# Patient Record
(demographics unavailable — no encounter records)

---

## 2024-10-28 NOTE — PHYSICAL EXAM
[Normal Coordination] : normal coordination [Normal DTR UE/LE] : normal DTR UE/LE  [Normal Sensation] : normal sensation [Normal Mood and Affect] : normal mood and affect [Oriented] : oriented [Able to Communicate] : able to communicate [Normal Skin] : normal skin [No Rash] : no rash [No Ulcers] : no ulcers [No Lesions] : no lesions [No obvious lymphadenopathy in areas examined] : no obvious lymphadenopathy in areas examined [Well Developed] : well developed [Peripheral vascular exam is grossly normal] : peripheral vascular exam is grossly normal [No Respiratory Distress] : no respiratory distress [Flexion] : flexion [Extension] : extension [4___] : left quadriceps 4[unfilled]/5 [3___] : left dorsiflexors 3[unfilled]/5 [5___] : right extensor hallicus longus 5[unfilled]/5 [de-identified] : Constitutional: - General Appearance: Unremarkable Body Habitus Well Developed Well Nourished Body Habitus No Deformities Well Groomed Ability To communicate: Normal Neurologic: Global sensation is intact to upper and lower extremities. See examination of Neck and/or Spine for exceptions. Orientation to Time, Place and Person is: Normal Mood And Affect is Normal Skin: - Head/Face, Right Upper/Lower Extremity, Left Upper/Lower Extremity: Normal See Examination of Neck and/or Spine for exceptions Cardiovascular: Peripheral Cardiovascular System is Normal Palpation of Lymph Nodes: Normal Palpation of lymph nodes in: Axilla, Cervical, Inguinal Abnormal Palpation of lymph nodes in: None  [] : motor exam is not 5/5 throughout both lower extremities with normal tone

## 2024-10-28 NOTE — DISCUSSION/SUMMARY
[de-identified] : L spine MRI 11/7/23 - laminectomy L5S1, grade 1 spondylolisthesis and b/ foraminal stenosis  Plan:  Patient will continue with interventional pain management procedures - she is awaiting authorization for indicated second injection by Dr. Alvarez which I would conquer would be beneficial in improving back/leg pan, as wlel as overall functional capacity. Discussed implementing voltaren gel and thermacare bandages to the affected area. She is also in the process of seeking chiropractic care. Surgical solutions were briefly discussed, but at this time, patient wishes to try and seek additional pain control with the above modalities. Recommend evaluation in six weeks. Risks and potential side effects of narcotics and NSAID medication's were discussed with the patient. Renewed ibuprofen and oxycodone.  - FUV 5/6 WKS.   Prior to appointment and during encounter with patient extensive medical records were reviewed including but not limited to, hospital records, outpatient records, imaging results, and lab data.During this appointment the patient was examined, diagnoses were discussed and explained in a face to face manner. In addition extensive time was spent reviewing aforementioned diagnostic studies. Counseling including abnormal image results, differential diagnoses, treatment options, risk and benefits, lifestyle changes, current condition, and current medications was performed. Patient's comments, questions, and concerns were addressed and patient verbalized understanding. Based on this patient's presentation at our office, which is an orthopedic spine surgeon's office, this patient inherently / intrinsically has a risk, however minute, of developing issues such as Cauda equina syndrome, bowel and bladder changes, or progression of motor or neurological deficits such as paralysis which may be permanent.  MEGHANA SERRANO Acting as a Scribe for Dr. Dontae ALVAREZ, Meghana Serrano, attest that this documentation has been prepared under the direction and in the presence of Provider Moisés Diggs MD.

## 2024-10-28 NOTE — DISCUSSION/SUMMARY
[de-identified] : L spine MRI 11/7/23 - laminectomy L5S1, grade 1 spondylolisthesis and b/ foraminal stenosis  Plan:  Patient will continue with interventional pain management procedures - she is awaiting authorization for indicated second injection by Dr. Alvarez which I would conquer would be beneficial in improving back/leg pan, as wlel as overall functional capacity. Discussed implementing voltaren gel and thermacare bandages to the affected area. She is also in the process of seeking chiropractic care. Surgical solutions were briefly discussed, but at this time, patient wishes to try and seek additional pain control with the above modalities. Recommend evaluation in six weeks. Risks and potential side effects of narcotics and NSAID medication's were discussed with the patient. Renewed ibuprofen and oxycodone.  - FUV 5/6 WKS.   Prior to appointment and during encounter with patient extensive medical records were reviewed including but not limited to, hospital records, outpatient records, imaging results, and lab data.During this appointment the patient was examined, diagnoses were discussed and explained in a face to face manner. In addition extensive time was spent reviewing aforementioned diagnostic studies. Counseling including abnormal image results, differential diagnoses, treatment options, risk and benefits, lifestyle changes, current condition, and current medications was performed. Patient's comments, questions, and concerns were addressed and patient verbalized understanding. Based on this patient's presentation at our office, which is an orthopedic spine surgeon's office, this patient inherently / intrinsically has a risk, however minute, of developing issues such as Cauda equina syndrome, bowel and bladder changes, or progression of motor or neurological deficits such as paralysis which may be permanent.  MEGHANA SERRANO Acting as a Scribe for Dr. Dontae ALVAREZ, Meghana Serrano, attest that this documentation has been prepared under the direction and in the presence of Provider Moisés Diggs MD.

## 2024-10-28 NOTE — HISTORY OF PRESENT ILLNESS
[Lower back] : lower back [Work related] : work related [5] : 5 [Dull/Aching] : dull/aching [Radiating] : radiating [Constant] : constant [Household chores] : household chores [Leisure] : leisure [Work] : work [Rest] : rest [Standing] : standing [Walking] : walking [Stairs] : stairs [Full time] : Work status: full time [de-identified] : 10/25/2024 - The patient is presenting for a workers' compensation follow-up. She complains of back and leg pain, with the back pain being more severe. The pain worsens throughout the workday and with bending. She received her first injection, but the second injection was denied. She plans to start chiropractic therapy shortly. She wishes to proceed with the indicated injection as recommended by Dr. Alvarez. Taking ibuprofen and oxycodone for symptom control which are both helpful. Voltaregn gel is also helpful for pain control during the day.   09/18/2024 - Patient presenting in follow up. Symptoms remain the same. Since the LESI she reports no real relief. She is awaiting to hear if a different injection procedure has been approved which was indicated by Dr. Alvarez. She continues home stretching exercises. Uses heat and ice for pain control. Taking oxycodone and ibuprofen as prescribed for symptom control. She is working. She is in the process of finding chiropractor who takes workers comp.   08/19/2024 - Patient returns to the office for follow up. She has received lumbar steroid injection. It has been 10 days since the injection. She does not feel any benefit. She has an appointment to follow up with Dr. Alvarez tomorrow to discuss potential additional injections or alternative procedures. She continues to use oxycodone and anti-inflammatory medication's. She has completed left surgery. She has not yet been able to establish chiropractic care with a provider who accepts her insurance  07/22/2024 - Pt presents in follow up. She complains of increase in left sided radicular leg pain. She had consult with Dr. Alvarez and [facility] Also feels chiropractic therapy may be beneficial in improving symptoms. Leg pain most prevalent while sleeping. Denies right sided leg pain. scheduled for left wrist surgery this Friday - not taking nsaids as a result. Takes oxycodone or Tylenol for control of symptoms.   06/21/2024 - Patient presenting in follow up. Patient reports no real changes. She complains of increased low back pain radiating into left leg. She is scheduled for left wrist surgery end of July. Takes oxycodone and ibuprofen for symptom control.   05/20/2024 - Patient presenting for follow up of back and leg pain. She primarily complains of pain in the left lower lumbar/buttock/hip region. She is managing her pain with oxycodone and ibuprofen as prescribed which is helpful. She is considering a LESI. No change to general medical health.   04/22/2024: Patient is here today for a follow up for back and leg pain. She states that she is currently experiencing a slight aggravation of her pain due to recent activity. She states that she experiences radiating pain when standing and leaning on the left side. She continues to perform at home exercises. Symptoms are consistent in intensity and location.   03/18/2024 - The patient reported experiencing both back and leg pain. She finds relief by taking ibuprofen daily and using oxycodone as prescribed at night. Although prior physical therapy was beneficial, it was discontinued due to a busy schedule; however, she continues to do the exercises at home. At this time, she is not interested in receiving interventional injections.  02/05/2024 - The patient is here for a follow-up, reporting constant low back pain along with pain radiating into the left leg. Despite these symptoms, she has returned to full-duty work and is effectively managing the pain. She takes ibuprofen and a muscle relaxer at night for relief. Additionally, she is actively participating in physical therapy, which has proven to be beneficial in managing her symptoms.  12/27/2023 - Patient presenting in follow up complaining of low back and left leg pain. She has been enrolled in physical therapy twice per week which is overall helpful. She has been out of work and feels ready to return. She is taking ibuprofen during the day and oxycodone at nighttime which is helpful for symptom control.   11/15/2023 - Patient presenting for MRI Review of L Spine. She primarily complains of left lower extremity pain/achiness worse with standing and weightbearing.   10/25/2023 - 45 y/o F presenting for initial evaluation of of low back pain, LLE numbness and weakness after a work-related injury on 9/12/23. 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 Landed onto her left side, wrist, and back. Symptom wise, since the accident she reports axial low back pain L > R. Also, complaints of intermittent numbness/tingling into the left leg. Denies any changes in LE strength. Wrist is being treated, she is wearing splint and received injections. She reports history of laminectomy a few years prior which was overall helpful. Weakness was never apparent in the past, and has been present since the injury. Treated with MDP and Tylenol without much relief. Pain becomes worse with extended periods of siting, standing, and walking. Received XRAYS which revealed no acute fractures. She is not working.    [] : no [FreeTextEntry3] : 9/12/2023 [FreeTextEntry7] : [employer] [de-identified] : [facility] [de-identified] : Teacher

## 2024-10-28 NOTE — PHYSICAL EXAM
[Normal Coordination] : normal coordination [Normal DTR UE/LE] : normal DTR UE/LE  [Normal Sensation] : normal sensation [Normal Mood and Affect] : normal mood and affect [Oriented] : oriented [Able to Communicate] : able to communicate [Normal Skin] : normal skin [No Rash] : no rash [No Ulcers] : no ulcers [No Lesions] : no lesions [No obvious lymphadenopathy in areas examined] : no obvious lymphadenopathy in areas examined [Well Developed] : well developed [Peripheral vascular exam is grossly normal] : peripheral vascular exam is grossly normal [No Respiratory Distress] : no respiratory distress [Flexion] : flexion [Extension] : extension [4___] : left quadriceps 4[unfilled]/5 [3___] : left dorsiflexors 3[unfilled]/5 [5___] : right extensor hallicus longus 5[unfilled]/5 [de-identified] : Constitutional: - General Appearance: Unremarkable Body Habitus Well Developed Well Nourished Body Habitus No Deformities Well Groomed Ability To communicate: Normal Neurologic: Global sensation is intact to upper and lower extremities. See examination of Neck and/or Spine for exceptions. Orientation to Time, Place and Person is: Normal Mood And Affect is Normal Skin: - Head/Face, Right Upper/Lower Extremity, Left Upper/Lower Extremity: Normal See Examination of Neck and/or Spine for exceptions Cardiovascular: Peripheral Cardiovascular System is Normal Palpation of Lymph Nodes: Normal Palpation of lymph nodes in: Axilla, Cervical, Inguinal Abnormal Palpation of lymph nodes in: None  [] : motor exam is not 5/5 throughout both lower extremities with normal tone

## 2024-10-28 NOTE — HISTORY OF PRESENT ILLNESS
[Lower back] : lower back [Work related] : work related [5] : 5 [Dull/Aching] : dull/aching [Radiating] : radiating [Constant] : constant [Household chores] : household chores [Leisure] : leisure [Work] : work [Rest] : rest [Standing] : standing [Walking] : walking [Stairs] : stairs [Full time] : Work status: full time [de-identified] : 10/25/2024 - The patient is presenting for a workers' compensation follow-up. She complains of back and leg pain, with the back pain being more severe. The pain worsens throughout the workday and with bending. She received her first injection, but the second injection was denied. She plans to start chiropractic therapy shortly. She wishes to proceed with the indicated injection as recommended by Dr. Alvarez. Taking ibuprofen and oxycodone for symptom control which are both helpful. Voltaregn gel is also helpful for pain control during the day.   09/18/2024 - Patient presenting in follow up. Symptoms remain the same. Since the LESI she reports no real relief. She is awaiting to hear if a different injection procedure has been approved which was indicated by Dr. Alvarez. She continues home stretching exercises. Uses heat and ice for pain control. Taking oxycodone and ibuprofen as prescribed for symptom control. She is working. She is in the process of finding chiropractor who takes workers comp.   08/19/2024 - Patient returns to the office for follow up. She has received lumbar steroid injection. It has been 10 days since the injection. She does not feel any benefit. She has an appointment to follow up with Dr. Alvarez tomorrow to discuss potential additional injections or alternative procedures. She continues to use oxycodone and anti-inflammatory medication's. She has completed left surgery. She has not yet been able to establish chiropractic care with a provider who accepts her insurance  07/22/2024 - Pt presents in follow up. She complains of increase in left sided radicular leg pain. She had consult with Dr. Alvarez and [facility] Also feels chiropractic therapy may be beneficial in improving symptoms. Leg pain most prevalent while sleeping. Denies right sided leg pain. scheduled for left wrist surgery this Friday - not taking nsaids as a result. Takes oxycodone or Tylenol for control of symptoms.   06/21/2024 - Patient presenting in follow up. Patient reports no real changes. She complains of increased low back pain radiating into left leg. She is scheduled for left wrist surgery end of July. Takes oxycodone and ibuprofen for symptom control.   05/20/2024 - Patient presenting for follow up of back and leg pain. She primarily complains of pain in the left lower lumbar/buttock/hip region. She is managing her pain with oxycodone and ibuprofen as prescribed which is helpful. She is considering a LESI. No change to general medical health.   04/22/2024: Patient is here today for a follow up for back and leg pain. She states that she is currently experiencing a slight aggravation of her pain due to recent activity. She states that she experiences radiating pain when standing and leaning on the left side. She continues to perform at home exercises. Symptoms are consistent in intensity and location.   03/18/2024 - The patient reported experiencing both back and leg pain. She finds relief by taking ibuprofen daily and using oxycodone as prescribed at night. Although prior physical therapy was beneficial, it was discontinued due to a busy schedule; however, she continues to do the exercises at home. At this time, she is not interested in receiving interventional injections.  02/05/2024 - The patient is here for a follow-up, reporting constant low back pain along with pain radiating into the left leg. Despite these symptoms, she has returned to full-duty work and is effectively managing the pain. She takes ibuprofen and a muscle relaxer at night for relief. Additionally, she is actively participating in physical therapy, which has proven to be beneficial in managing her symptoms.  12/27/2023 - Patient presenting in follow up complaining of low back and left leg pain. She has been enrolled in physical therapy twice per week which is overall helpful. She has been out of work and feels ready to return. She is taking ibuprofen during the day and oxycodone at nighttime which is helpful for symptom control.   11/15/2023 - Patient presenting for MRI Review of L Spine. She primarily complains of left lower extremity pain/achiness worse with standing and weightbearing.   10/25/2023 - 43 y/o F presenting for initial evaluation of of low back pain, LLE numbness and weakness after a work-related injury on 9/12/23. 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 Landed onto her left side, wrist, and back. Symptom wise, since the accident she reports axial low back pain L > R. Also, complaints of intermittent numbness/tingling into the left leg. Denies any changes in LE strength. Wrist is being treated, she is wearing splint and received injections. She reports history of laminectomy a few years prior which was overall helpful. Weakness was never apparent in the past, and has been present since the injury. Treated with MDP and Tylenol without much relief. Pain becomes worse with extended periods of siting, standing, and walking. Received XRAYS which revealed no acute fractures. She is not working.    [] : no [FreeTextEntry3] : 9/12/2023 [FreeTextEntry7] : [employer] [de-identified] : [facility] [de-identified] : Teacher

## 2024-11-07 NOTE — PHYSICAL EXAM
[de-identified] : Constitutional:   - No acute distress   - Well developed; well nourished    Neurological:   - normal mood and affect   - alert and oriented x 3     Cardiovascular:   - grossly normal   Lumbar Spine Exam:   Inspection: erythema (-) ecchymosis (-) rashes (-) alignment: no scoliosis Well healed surgical scar midline  Palpation: Midline lumbar tenderness:            (-) midline thoracic tenderness:          (-) Lumbar paraspinal tenderness:  L (+) ; R (-) thoracic paraspinal tenderness: L (-) ; R (-) sciatic nerve tenderness:           L (+) ; R (-) SI joint tenderness:                     L (-) ; R (-) GTB tenderness:                        L (-);  R (-)   ROM: WNL; stiffness throughout ROM testing pain with extremes of flexion/extension   Strength:                                    Right       Left    Hip Flexion:                (5/5)       (5/5) Quadriceps:               (5/5)       (5/5) Hamstrings:                (5/5)       (5/5) Ankle Dorsiflexion:     (5/5)       (4/5) EHL:                           (5/5)       (4/5) Ankle Plantarflexion:  (5/5)       (5/5)   Special Tests: SLR:                            R (-) ; L (+) Facet loading:             R (-) ; L (-) LORENA test:                R (N/A) ; L (N/A) Hamstring tightness:   R (-);  L (-)   Neurologic: SILT throughout right lower extremity SILT throughout left lower extremity with exception of left L5 distribution   Reflexes normal and symmetric bilateral lower extremities   Gait: non- antalgic gait ambulates without assistive device

## 2024-11-07 NOTE — PHYSICAL EXAM
[de-identified] : Constitutional:   - No acute distress   - Well developed; well nourished    Neurological:   - normal mood and affect   - alert and oriented x 3     Cardiovascular:   - grossly normal   Lumbar Spine Exam:   Inspection: erythema (-) ecchymosis (-) rashes (-) alignment: no scoliosis Well healed surgical scar midline  Palpation: Midline lumbar tenderness:            (-) midline thoracic tenderness:          (-) Lumbar paraspinal tenderness:  L (+) ; R (-) thoracic paraspinal tenderness: L (-) ; R (-) sciatic nerve tenderness:           L (+) ; R (-) SI joint tenderness:                     L (-) ; R (-) GTB tenderness:                        L (-);  R (-)   ROM: WNL; stiffness throughout ROM testing pain with extremes of flexion/extension   Strength:                                    Right       Left    Hip Flexion:                (5/5)       (5/5) Quadriceps:               (5/5)       (5/5) Hamstrings:                (5/5)       (5/5) Ankle Dorsiflexion:     (5/5)       (4/5) EHL:                           (5/5)       (4/5) Ankle Plantarflexion:  (5/5)       (5/5)   Special Tests: SLR:                            R (-) ; L (+) Facet loading:             R (-) ; L (-) LORENA test:                R (N/A) ; L (N/A) Hamstring tightness:   R (-);  L (-)   Neurologic: SILT throughout right lower extremity SILT throughout left lower extremity with exception of left L5 distribution   Reflexes normal and symmetric bilateral lower extremities   Gait: non- antalgic gait ambulates without assistive device

## 2024-11-07 NOTE — HISTORY OF PRESENT ILLNESS
[Lower back] : lower back [Work related] : work related [5] : 5 [3] : 3 [Dull/Aching] : dull/aching [Radiating] : radiating [Shooting] : shooting [Constant] : constant [Household chores] : household chores [Leisure] : leisure [Sleep] : sleep [Meds] : meds [Ice] : ice [Heat] : heat [Full time] : Work status: full time [FreeTextEntry1] : 2024 - Patient presents for 3-month W/C FUV regarding their low back pain. Patient reports although previously reported minimal relief with DANITZA on 24, she now notes that she did in fact receive >50% reduction in pain for at least 8 weeks. 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 She found significant improvement in her functionality and quality of life. Her pain gradually returned to baseline and is now impacting her ADLs and functionality.  She presents today to discuss next steps in her treatment plan with the hope of decreasing her pain and increasing her functionality and quality of life.   2024 - Patient presents for FUV after a Left L5-S1 TFESI on 24.  She reports minimal relief after the procedure.  Still continues to complain of low back pain with radiation to the left lower extremity extending to the calf.  Patient remains on ibuprofen and oxycodone as needed.  The patient presents for initial W/C evaluation regarding their low back pain. Patient was referred by Dr. Diggs. 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 Patient had a L5-S1 laminectomy/discectomy about 5 years ago which had resolver her radicular leg pain at the time.  After the work injury her pain is across the lower back with radiation down the left lower extremity; this is markedly worse than prior.  Of note, patient is scheduled for left wrist surgery in the upcoming weeks.  Patient had missed several months following her injury have returned to a full duty capacity in 2024.  WC DOI: 2023 Occupation: Teacher Working status: Full duty  Injections: 1) left L5-S1 TFESI (2024)  Pertinent Surgical History: 1) L5-S1 Discectomy (~5 years ago) - Dr. Trejo  Imagin) MRI Lumbar Spine (2023) - [facility]  At L1-2: No significant spinal canal or neural foraminal stenosis. At L2-3: No significant spinal canal or neural foraminal stenosis. At L3-4: No significant spinal canal or neural foraminal stenosis. At L4-5: No significant spinal canal or neural foraminal stenosis. At L5-S1: Bilateral hemilaminectomy changes are noted. There is grade 1 anterolisthesis of L5 on S1 with uncovering of the intervertebral disc. There is a superimposed disc bulge with bilateral facet arthrosis resulting in severe left with impingement of the exiting L5 nerve root and mild right-sided neural foraminal narrowing.    Physician Disclaimer: I have personally reviewed and confirmed all HPI data with the patient.  [] : Patient is currently injured and not playing sports: no [FreeTextEntry3] : 9/12/23 [FreeTextEntry7] : LEFT LEG  [de-identified] : After work  [de-identified] : L MRI AT

## 2024-11-07 NOTE — HISTORY OF PRESENT ILLNESS
[Lower back] : lower back [Work related] : work related [5] : 5 [3] : 3 [Dull/Aching] : dull/aching [Radiating] : radiating [Shooting] : shooting [Constant] : constant [Household chores] : household chores [Leisure] : leisure [Sleep] : sleep [Meds] : meds [Ice] : ice [Heat] : heat [Full time] : Work status: full time [FreeTextEntry1] : 2024 - Patient presents for 3-month W/C FUV regarding their low back pain. Patient reports although previously reported minimal relief with DANITZA on 24, she now notes that she did in fact receive >50% reduction in pain for at least 8 weeks. 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 She found significant improvement in her functionality and quality of life. Her pain gradually returned to baseline and is now impacting her ADLs and functionality.  She presents today to discuss next steps in her treatment plan with the hope of decreasing her pain and increasing her functionality and quality of life.   2024 - Patient presents for FUV after a Left L5-S1 TFESI on 24.  She reports minimal relief after the procedure.  Still continues to complain of low back pain with radiation to the left lower extremity extending to the calf.  Patient remains on ibuprofen and oxycodone as needed.  The patient presents for initial W/C evaluation regarding their low back pain. Patient was referred by Dr. Diggs. 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 Patient had a L5-S1 laminectomy/discectomy about 5 years ago which had resolver her radicular leg pain at the time.  After the work injury her pain is across the lower back with radiation down the left lower extremity; this is markedly worse than prior.  Of note, patient is scheduled for left wrist surgery in the upcoming weeks.  Patient had missed several months following her injury have returned to a full duty capacity in 2024.  WC DOI: 2023 Occupation: Teacher Working status: Full duty  Injections: 1) left L5-S1 TFESI (2024)  Pertinent Surgical History: 1) L5-S1 Discectomy (~5 years ago) - Dr. Trejo  Imagin) MRI Lumbar Spine (2023) - [facility]  At L1-2: No significant spinal canal or neural foraminal stenosis. At L2-3: No significant spinal canal or neural foraminal stenosis. At L3-4: No significant spinal canal or neural foraminal stenosis. At L4-5: No significant spinal canal or neural foraminal stenosis. At L5-S1: Bilateral hemilaminectomy changes are noted. There is grade 1 anterolisthesis of L5 on S1 with uncovering of the intervertebral disc. There is a superimposed disc bulge with bilateral facet arthrosis resulting in severe left with impingement of the exiting L5 nerve root and mild right-sided neural foraminal narrowing.    Physician Disclaimer: I have personally reviewed and confirmed all HPI data with the patient.  [] : Patient is currently injured and not playing sports: no [FreeTextEntry3] : 9/12/23 [FreeTextEntry7] : LEFT LEG  [de-identified] : After work  [de-identified] : L MRI AT

## 2024-11-07 NOTE — ASSESSMENT
[FreeTextEntry1] : A discussion regarding available pain management treatment options occurred with the patient.  These included interventional, rehabilitative, pharmacological, and alternative modalities. We will proceed with the following:    Interventional treatment options:   - Proceed with left L5-S1, S1 TFESI (80 mg Kenalog) with fluoroscopic guidance - Patient experienced a significant reduction of her pain and overall improved functionality following her initial DANITZA - see additional instructions below    Rehabilitative options:   - Completed physical therapy trial - participation in active HEP was discussed and encouraged as tolerated  Medication based treatment options:   - Continue ibuprofen 400-600 mg up to TID as needed - Patient on chronic opioid therapy; once again discussed risks of habituation, tolerance, and dependence - see additional instructions below    Complementary treatment options:   - Weight management and lifestyle modifications discussed  Additional treatment recommendations as follows:   - patient will follow-up with Dr. Diggs as directed - Follow up for indicated procedure or 6 weeks  The risks, benefits and alternatives of the proposed procedure were explained in detail with the patient. The risks outlined include but are not limited to infection, bleeding, post- dural puncture headache, nerve injury, a temporary increase in pain, failure to resolve symptoms, need for future interventions, allergic reaction, and possible elevation of blood sugar in diabetics if using corticosteroid.  All questions were answered to patient's apparent satisfaction, and he/she verbalized an understanding.  We have discussed the risks, benefits, and alternatives for NSAID therapy including but not limited to the risk of bleeding, thrombosis, gastric mucosal irritation/ulceration, allergic reaction and kidney dysfunction.  The patient verbalizes an understanding.  I, Amy Price NP, acting as scribe, attest that this documentation has been prepared under the direction and in the presence of Provider Gennaro Alvarez DO.    The documentation recorded by the scribe, in my presence, accurately reflects the service I personally performed, and the decisions made by me with my edits as appropriate.

## 2024-11-07 NOTE — WORK
[Partial] : partial [Reveals pre-existing condition(s) that may affect treatment/prognosis] : reveals pre-existing condition(s) that may affect treatment/prognosis [I provided the services listed above] :  I provided the services listed above. [Severe Partial] : severe partial [FreeTextEntry2] : Injury is an exacerbation of underlying chronic condition [FreeTextEntry3] : Degree of impairment above with regard to lumbar spine only

## 2024-11-24 NOTE — HISTORY OF PRESENT ILLNESS
[7] : 7 [5] : 5 [Full time] : Work status: full time [] : yes [de-identified] : 11/22/2024 - Patient presenting in follow up workers comp. She complains of intermittent left leg pain. Pain is worse with extended sitting. Additional LESI at the L5S1 level was approved, and she is in the process of setting up another injection. Started going to chiropractic therapy recently and still evaluating her response. Her medication regimen remains the same.   10/25/2024 - The patient is presenting for a workers' compensation follow-up. She complains of back and leg pain, with the back pain being more severe. The pain worsens throughout the workday and with bending. She received her first injection, but the second injection was denied. She plans to start chiropractic therapy shortly. She wishes to proceed with the indicated injection as recommended by Dr. Alvarez. Taking ibuprofen and oxycodone for symptom control which are both helpful. Voltaregn gel is also helpful for pain control during the day.   09/18/2024 - Patient presenting in follow up. Symptoms remain the same. Since the LESI she reports no real relief. She is awaiting to hear if a different injection procedure has been approved which was indicated by Dr. Alvarez. She continues home stretching exercises. Uses heat and ice for pain control. Taking oxycodone and ibuprofen as prescribed for symptom control. She is working. She is in the process of finding chiropractor who takes workers comp.   08/19/2024 - Patient returns to the office for follow up. She has received lumbar steroid injection. It has been 10 days since the injection. She does not feel any benefit. She has an appointment to follow up with Dr. Alvarez tomorrow to discuss potential additional injections or alternative procedures. She continues to use oxycodone and anti-inflammatory medication's. She has completed left surgery. She has not yet been able to establish chiropractic care with a provider who accepts her insurance  07/22/2024 - Pt presents in follow up. She complains of increase in left sided radicular leg pain. She had consult with Dr. Alvarez and she plans to move forward with LESI. Also feels chiropractic therapy may be beneficial in improving symptoms. Leg pain most prevalent while sleeping. Denies right sided leg pain. scheduled for left wrist surgery this Friday - not taking nsaids as a result. Takes oxycodone or Tylenol for control of symptoms.   06/21/2024 - Patient presenting in follow up. Patient reports no real changes. She complains of increased low back pain radiating into left leg. She is scheduled for left wrist surgery end of July. Takes oxycodone and ibuprofen for symptom control.   05/20/2024 - Patient presenting for follow up of back and leg pain. She primarily complains of pain in the left lower lumbar/buttock/hip region. She is managing her pain with oxycodone and ibuprofen as prescribed which is helpful. She is considering a LESI. No change to general medical health.   04/22/2024: Patient is here today for a follow up for back and leg pain. She states that she is currently experiencing a slight aggravation of her pain due to recent activity. She states that she experiences radiating pain when standing and leaning on the left side. She continues to perform at home exercises. Symptoms are consistent in intensity and location.   03/18/2024 - The patient reported experiencing both back and leg pain. She finds relief by taking ibuprofen daily and using oxycodone as prescribed at night. Although prior physical therapy was beneficial, it was discontinued due to a busy schedule; however, she continues to do the exercises at home. At this time, she is not interested in receiving interventional injections.  02/05/2024 - The patient is here for a follow-up, reporting constant low back pain along with pain radiating into the left leg. Despite these symptoms, she has returned to full-duty work and is effectively managing the pain. She takes ibuprofen and a muscle relaxer at night for relief. Additionally, she is actively participating in physical therapy, which has proven to be beneficial in managing her symptoms.  12/27/2023 - Patient presenting in follow up complaining of low back and left leg pain. She has been enrolled in physical therapy twice per week which is overall helpful. She has been out of work and feels ready to return. She is taking ibuprofen during the day and oxycodone at nighttime which is helpful for symptom control.   11/15/2023 - Patient presenting for MRI Review of L Spine. She primarily complains of left lower extremity pain/achiness worse with standing and weightbearing.   10/25/2023 - 45 y/o F presenting for initial evaluation of of low back pain, LLE numbness and weakness after a work-related injury on 9/12/23. 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 Landed onto her left side, wrist, and back. Symptom wise, since the accident she reports axial low back pain L > R. Also, complaints of intermittent numbness/tingling into the left leg. Denies any changes in LE strength. Wrist is being treated, she is wearing splint and received injections. She reports history of laminectomy a few years prior which was overall helpful. Weakness was never apparent in the past, and has been present since the injury. Treated with MDP and Tylenol without much relief. Pain becomes worse with extended periods of siting, standing, and walking. Received XRAYS which revealed no acute fractures. She is not working.    [de-identified] : hep, chiropractor

## 2024-11-24 NOTE — DISCUSSION/SUMMARY
[de-identified] : L spine MRI 11/7/23 - laminectomy L5S1, grade 1 spondylolisthesis and b/ foraminal stenosis  Plan:  Patient will continue with interventional pain management procedures - she will move forward with indicated LESI at L5S1 which was recently improved by Dr. Alvarez. If pain persists despite this discussed possible MBBS. Discussed implementing voltaren gel and thermacare bandages to the affected area. Discussed continuation of chiropractic care. Surgical solutions were briefly discussed, but at this time, patient wishes to try and seek additional pain control with the above modalities. Recommend evaluation in six weeks. Risks and potential side effects of narcotics and NSAID medication's were discussed with the patient. Renewed ibuprofen and oxycodone.  - FUV 5/6 WKS.   Prior to appointment and during encounter with patient extensive medical records were reviewed including but not limited to, hospital records, outpatient records, imaging results, and lab data.During this appointment the patient was examined, diagnoses were discussed and explained in a face to face manner. In addition extensive time was spent reviewing aforementioned diagnostic studies. Counseling including abnormal image results, differential diagnoses, treatment options, risk and benefits, lifestyle changes, current condition, and current medications was performed. Patient's comments, questions, and concerns were addressed and patient verbalized understanding. Based on this patient's presentation at our office, which is an orthopedic spine surgeon's office, this patient inherently / intrinsically has a risk, however minute, of developing issues such as Cauda equina syndrome, bowel and bladder changes, or progression of motor or neurological deficits such as paralysis which may be permanent.  MEGHANA SERRANO Acting as a Scribe for Dr. Dontae ALVAREZ, Meghana Serrano, attest that this documentation has been prepared under the direction and in the presence of Provider Moisés Diggs MD.

## 2024-11-24 NOTE — DISCUSSION/SUMMARY
[de-identified] : L spine MRI 11/7/23 - laminectomy L5S1, grade 1 spondylolisthesis and b/ foraminal stenosis  Plan:  Patient will continue with interventional pain management procedures - she will move forward with indicated LESI at L5S1 which was recently improved by Dr. Alvarez. If pain persists despite this discussed possible MBBS. Discussed implementing voltaren gel and thermacare bandages to the affected area. Discussed continuation of chiropractic care. Surgical solutions were briefly discussed, but at this time, patient wishes to try and seek additional pain control with the above modalities. Recommend evaluation in six weeks. Risks and potential side effects of narcotics and NSAID medication's were discussed with the patient. Renewed ibuprofen and oxycodone.  - FUV 5/6 WKS.   Prior to appointment and during encounter with patient extensive medical records were reviewed including but not limited to, hospital records, outpatient records, imaging results, and lab data.During this appointment the patient was examined, diagnoses were discussed and explained in a face to face manner. In addition extensive time was spent reviewing aforementioned diagnostic studies. Counseling including abnormal image results, differential diagnoses, treatment options, risk and benefits, lifestyle changes, current condition, and current medications was performed. Patient's comments, questions, and concerns were addressed and patient verbalized understanding. Based on this patient's presentation at our office, which is an orthopedic spine surgeon's office, this patient inherently / intrinsically has a risk, however minute, of developing issues such as Cauda equina syndrome, bowel and bladder changes, or progression of motor or neurological deficits such as paralysis which may be permanent.  MEGHANA SERRANO Acting as a Scribe for Dr. Dontae ALVAREZ, Meghana Serrano, attest that this documentation has been prepared under the direction and in the presence of Provider Moisés Diggs MD.

## 2024-11-24 NOTE — HISTORY OF PRESENT ILLNESS
Parent requests a refill for the patient's everyday inhaler.  Parent states that they are in between medical insurance and QVar without the insurance is $300.  Do you know of a different cheaper inhaler that they can use.  She is in between medical insurance.     Here are a few examples when I called [facility]. I can call the pharmacy again if you have preferences on the type/name.     Generic Provental (Albuterol)   Flovent $311  Generic Advair Diskus $146     LAST REFILL:  11/6/2020 (5 refills)  LAST WCE:  10/5/2020    Please advise.     Patient's mother is working until 430pm.  We can leave a message on her phone.   [phone]   [7] : 7 [5] : 5 [Full time] : Work status: full time [] : yes [de-identified] : 11/22/2024 - Patient presenting in follow up workers comp. She complains of intermittent left leg pain. Pain is worse with extended sitting. Additional LESI at the L5S1 level was approved, and she is in the process of setting up another injection. Started going to chiropractic therapy recently and still evaluating her response. Her medication regimen remains the same.   10/25/2024 - The patient is presenting for a workers' compensation follow-up. She complains of back and leg pain, with the back pain being more severe. The pain worsens throughout the workday and with bending. She received her first injection, but the second injection was denied. She plans to start chiropractic therapy shortly. She wishes to proceed with the indicated injection as recommended by Dr. Alvarez. Taking ibuprofen and oxycodone for symptom control which are both helpful. Voltaregn gel is also helpful for pain control during the day.   09/18/2024 - Patient presenting in follow up. Symptoms remain the same. Since the LESI she reports no real relief. She is awaiting to hear if a different injection procedure has been approved which was indicated by Dr. Alvarez. She continues home stretching exercises. Uses heat and ice for pain control. Taking oxycodone and ibuprofen as prescribed for symptom control. She is working. She is in the process of finding chiropractor who takes workers comp.   08/19/2024 - Patient returns to the office for follow up. She has received lumbar steroid injection. It has been 10 days since the injection. She does not feel any benefit. She has an appointment to follow up with Dr. Alvarez tomorrow to discuss potential additional injections or alternative procedures. She continues to use oxycodone and anti-inflammatory medication's. She has completed left surgery. She has not yet been able to establish chiropractic care with a provider who accepts her insurance  07/22/2024 - Pt presents in follow up. She complains of increase in left sided radicular leg pain. She had consult with Dr. Alvarez and she plans to move forward with LESI. Also feels chiropractic therapy may be beneficial in improving symptoms. Leg pain most prevalent while sleeping. Denies right sided leg pain. scheduled for left wrist surgery this Friday - not taking nsaids as a result. Takes oxycodone or Tylenol for control of symptoms.   06/21/2024 - Patient presenting in follow up. Patient reports no real changes. She complains of increased low back pain radiating into left leg. She is scheduled for left wrist surgery end of July. Takes oxycodone and ibuprofen for symptom control.   05/20/2024 - Patient presenting for follow up of back and leg pain. She primarily complains of pain in the left lower lumbar/buttock/hip region. She is managing her pain with oxycodone and ibuprofen as prescribed which is helpful. She is considering a LESI. No change to general medical health.   04/22/2024: Patient is here today for a follow up for back and leg pain. She states that she is currently experiencing a slight aggravation of her pain due to recent activity. She states that she experiences radiating pain when standing and leaning on the left side. She continues to perform at home exercises. Symptoms are consistent in intensity and location.   03/18/2024 - The patient reported experiencing both back and leg pain. She finds relief by taking ibuprofen daily and using oxycodone as prescribed at night. Although prior physical therapy was beneficial, it was discontinued due to a busy schedule; however, she continues to do the exercises at home. At this time, she is not interested in receiving interventional injections.  02/05/2024 - The patient is here for a follow-up, reporting constant low back pain along with pain radiating into the left leg. Despite these symptoms, she has returned to full-duty work and is effectively managing the pain. She takes ibuprofen and a muscle relaxer at night for relief. Additionally, she is actively participating in physical therapy, which has proven to be beneficial in managing her symptoms.  12/27/2023 - Patient presenting in follow up complaining of low back and left leg pain. She has been enrolled in physical therapy twice per week which is overall helpful. She has been out of work and feels ready to return. She is taking ibuprofen during the day and oxycodone at nighttime which is helpful for symptom control.   11/15/2023 - Patient presenting for MRI Review of L Spine. She primarily complains of left lower extremity pain/achiness worse with standing and weightbearing.   10/25/2023 - 45 y/o F presenting for initial evaluation of of low back pain, LLE numbness and weakness after a work-related injury on 9/12/23. 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 Landed onto her left side, wrist, and back. Symptom wise, since the accident she reports axial low back pain L > R. Also, complaints of intermittent numbness/tingling into the left leg. Denies any changes in LE strength. Wrist is being treated, she is wearing splint and received injections. She reports history of laminectomy a few years prior which was overall helpful. Weakness was never apparent in the past, and has been present since the injury. Treated with MDP and Tylenol without much relief. Pain becomes worse with extended periods of siting, standing, and walking. Received XRAYS which revealed no acute fractures. She is not working.    [de-identified] : hep, chiropractor

## 2024-11-24 NOTE — PHYSICAL EXAM
[Normal Coordination] : normal coordination [Normal DTR UE/LE] : normal DTR UE/LE  [Normal Sensation] : normal sensation [Normal Mood and Affect] : normal mood and affect [Oriented] : oriented [Able to Communicate] : able to communicate [Normal Skin] : normal skin [No Rash] : no rash [No Ulcers] : no ulcers [No Lesions] : no lesions [No obvious lymphadenopathy in areas examined] : no obvious lymphadenopathy in areas examined [Well Developed] : well developed [Peripheral vascular exam is grossly normal] : peripheral vascular exam is grossly normal [No Respiratory Distress] : no respiratory distress [Extension] : extension [4___] : left quadriceps 4[unfilled]/5 [3___] : left dorsiflexors 3[unfilled]/5 [5___] : right extensor hallicus longus 5[unfilled]/5 [de-identified] : Constitutional: - General Appearance: Unremarkable Body Habitus Well Developed Well Nourished Body Habitus No Deformities Well Groomed Ability To communicate: Normal Neurologic: Global sensation is intact to upper and lower extremities. See examination of Neck and/or Spine for exceptions. Orientation to Time, Place and Person is: Normal Mood And Affect is Normal Skin: - Head/Face, Right Upper/Lower Extremity, Left Upper/Lower Extremity: Normal See Examination of Neck and/or Spine for exceptions Cardiovascular: Peripheral Cardiovascular System is Normal Palpation of Lymph Nodes: Normal Palpation of lymph nodes in: Axilla, Cervical, Inguinal Abnormal Palpation of lymph nodes in: None  [] : non-antalgic [FreeTextEntry9] : Relief with Flexion

## 2024-11-24 NOTE — PHYSICAL EXAM
[Normal Coordination] : normal coordination [Normal DTR UE/LE] : normal DTR UE/LE  [Normal Sensation] : normal sensation [Normal Mood and Affect] : normal mood and affect [Oriented] : oriented [Able to Communicate] : able to communicate [Normal Skin] : normal skin [No Rash] : no rash [No Ulcers] : no ulcers [No Lesions] : no lesions [No obvious lymphadenopathy in areas examined] : no obvious lymphadenopathy in areas examined [Well Developed] : well developed [Peripheral vascular exam is grossly normal] : peripheral vascular exam is grossly normal [No Respiratory Distress] : no respiratory distress [Extension] : extension [4___] : left quadriceps 4[unfilled]/5 [3___] : left dorsiflexors 3[unfilled]/5 [5___] : right extensor hallicus longus 5[unfilled]/5 [de-identified] : Constitutional: - General Appearance: Unremarkable Body Habitus Well Developed Well Nourished Body Habitus No Deformities Well Groomed Ability To communicate: Normal Neurologic: Global sensation is intact to upper and lower extremities. See examination of Neck and/or Spine for exceptions. Orientation to Time, Place and Person is: Normal Mood And Affect is Normal Skin: - Head/Face, Right Upper/Lower Extremity, Left Upper/Lower Extremity: Normal See Examination of Neck and/or Spine for exceptions Cardiovascular: Peripheral Cardiovascular System is Normal Palpation of Lymph Nodes: Normal Palpation of lymph nodes in: Axilla, Cervical, Inguinal Abnormal Palpation of lymph nodes in: None  [] : non-antalgic [FreeTextEntry9] : Relief with Flexion

## 2024-12-26 NOTE — DISCUSSION/SUMMARY
[de-identified] : Medications were renewed in the office today. Recommend she withhold from chiropractic treatment over the next several weeks to see if she is feeling any different. She feels that it's possible to chiropractic manipulation is only making things worse. She's eager to obtain any additional benefit from the scheduled stairway injection. Medial branch blocks were discussed. This may be a modality to consider following the steroid injection. She will follow up in six weeks, which should be approximately two weeks after scheduled steroid injection  Prior to appointment and during encounter with patient extensive medical records were reviewed including but not limited to, hospital records, outpatient records, imaging results, and lab data.During this appointment the patient was examined, diagnoses were discussed and explained in a face to face manner. In addition extensive time was spent reviewing aforementioned diagnostic studies. Counseling including abnormal image results, differential diagnoses, treatment options, risk and benefits, lifestyle changes, current condition, and current medications was performed. Patient's comments, questions, and concerns were addressed and patient verbalized understanding. Based on this patient's presentation at our office, which is an orthopedic spine surgeon's office, this patient inherently / intrinsically has a risk, however minute, of developing issues such as Cauda equina syndrome, bowel and bladder changes, or progression of motor or neurological deficits such as paralysis which may be permanent.  Joseluis Craft Acting as a Scribe for Joseluis Malik, attest that this documentation has been prepared under the direction and in the presence of Provider Moisés Diggs MD.

## 2024-12-26 NOTE — PHYSICAL EXAM
[Normal Coordination] : normal coordination [Normal DTR UE/LE] : normal DTR UE/LE  [Normal Sensation] : normal sensation [Normal Mood and Affect] : normal mood and affect [Oriented] : oriented [Able to Communicate] : able to communicate [Normal Skin] : normal skin [No Rash] : no rash [No Ulcers] : no ulcers [No Lesions] : no lesions [No obvious lymphadenopathy in areas examined] : no obvious lymphadenopathy in areas examined [Well Developed] : well developed [Peripheral vascular exam is grossly normal] : peripheral vascular exam is grossly normal [No Respiratory Distress] : no respiratory distress [Extension] : extension [4___] : left quadriceps 4[unfilled]/5 [3___] : left dorsiflexors 3[unfilled]/5 [5___] : right extensor hallicus longus 5[unfilled]/5 [de-identified] : Constitutional: - General Appearance: Unremarkable Body Habitus Well Developed Well Nourished Body Habitus No Deformities Well Groomed Ability To communicate: Normal Neurologic: Global sensation is intact to upper and lower extremities. See examination of Neck and/or Spine for exceptions. Orientation to Time, Place and Person is: Normal Mood And Affect is Normal Skin: - Head/Face, Right Upper/Lower Extremity, Left Upper/Lower Extremity: Normal See Examination of Neck and/or Spine for exceptions Cardiovascular: Peripheral Cardiovascular System is Normal Palpation of Lymph Nodes: Normal Palpation of lymph nodes in: Axilla, Cervical, Inguinal Abnormal Palpation of lymph nodes in: None  [] : non-antalgic [FreeTextEntry9] : Relief with Flexion

## 2024-12-26 NOTE — HISTORY OF PRESENT ILLNESS
[7] : 7 [Full time] : Work status: full time [] : yes [de-identified] : 12/23/2024:  Patient presenting today for a follow up visit. She continues to experience lower back pain with symptoms radiating into the distal lower extremity. She is using the anti-inflammatory tablet regularly along with the oxycodone. She is scheduled an additional injection with Dr. Nguyen for end of January 2025. She has been continuing with chiropractic treatment but does not feel this is giving her any significant benefit lately.   11/22/2024 - Patient presenting in follow up workers comp. She complains of intermittent left leg pain. Pain is worse with extended sitting. Additional LESI at the L5S1 level was approved, and she is in the process of setting up another injection. Started going to chiropractic therapy recently and still evaluating her response. Her medication regimen remains the same.   10/25/2024 - The patient is presenting for a workers' compensation follow-up. She complains of back and leg pain, with the back pain being more severe. The pain worsens throughout the workday and with bending. She received her first injection, but the second injection was denied. She plans to start chiropractic therapy shortly. She wishes to proceed with the indicated injection as recommended by Dr. Alvarez. Taking ibuprofen and oxycodone for symptom control which are both helpful. Voltaregn gel is also helpful for pain control during the day.   09/18/2024 - Patient presenting in follow up. Symptoms remain the same. Since the LESI she reports no real relief. She is awaiting to hear if a different injection procedure has been approved which was indicated by Dr. Alvarez. She continues home stretching exercises. Uses heat and ice for pain control. Taking oxycodone and ibuprofen as prescribed for symptom control. She is working. She is in the process of finding chiropractor who takes workers comp.   08/19/2024 - Patient returns to the office for follow up. She has received lumbar steroid injection. It has been 10 days since the injection. She does not feel any benefit. She has an appointment to follow up with Dr. Alvarez tomorrow to discuss potential additional injections or alternative procedures. She continues to use oxycodone and anti-inflammatory medication's. She has completed left surgery. She has not yet been able to establish chiropractic care with a provider who accepts her insurance  07/22/2024 - Pt presents in follow up. She complains of increase in left sided radicular leg pain. She had consult with Dr. Alvarez and she plans to move forward with LESI. Also feels chiropractic therapy may be beneficial in improving symptoms. Leg pain most prevalent while sleeping. Denies right sided leg pain. scheduled for left wrist surgery this Friday - not taking nsaids as a result. Takes oxycodone or Tylenol for control of symptoms.   06/21/2024 - Patient presenting in follow up. Patient reports no real changes. She complains of increased low back pain radiating into left leg. She is scheduled for left wrist surgery end of July. Takes oxycodone and ibuprofen for symptom control.   05/20/2024 - Patient presenting for follow up of back and leg pain. She primarily complains of pain in the left lower lumbar/buttock/hip region. She is managing her pain with oxycodone and ibuprofen as prescribed which is helpful. She is considering a LESI. No change to general medical health.   04/22/2024: Patient is here today for a follow up for back and leg pain. She states that she is currently experiencing a slight aggravation of her pain due to recent activity. She states that she experiences radiating pain when standing and leaning on the left side. She continues to perform at home exercises. Symptoms are consistent in intensity and location.   03/18/2024 - The patient reported experiencing both back and leg pain. She finds relief by taking ibuprofen daily and using oxycodone as prescribed at night. Although prior physical therapy was beneficial, it was discontinued due to a busy schedule; however, she continues to do the exercises at home. At this time, she is not interested in receiving interventional injections.  02/05/2024 - The patient is here for a follow-up, reporting constant low back pain along with pain radiating into the left leg. Despite these symptoms, she has returned to full-duty work and is effectively managing the pain. She takes ibuprofen and a muscle relaxer at night for relief. Additionally, she is actively participating in physical therapy, which has proven to be beneficial in managing her symptoms.  12/27/2023 - Patient presenting in follow up complaining of low back and left leg pain. She has been enrolled in physical therapy twice per week which is overall helpful. She has been out of work and feels ready to return. She is taking ibuprofen during the day and oxycodone at nighttime which is helpful for symptom control.   11/15/2023 - Patient presenting for MRI Review of L Spine. She primarily complains of left lower extremity pain/achiness worse with standing and weightbearing.   10/25/2023 - 45 y/o F presenting for initial evaluation of of low back pain, LLE numbness and weakness after a work-related injury on 9/12/23. 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 Landed onto her left side, wrist, and back. Symptom wise, since the accident she reports axial low back pain L > R. Also, complaints of intermittent numbness/tingling into the left leg. Denies any changes in LE strength. Wrist is being treated, she is wearing splint and received injections. She reports history of laminectomy a few years prior which was overall helpful. Weakness was never apparent in the past, and has been present since the injury. Treated with MDP and Tylenol without much relief. Pain becomes worse with extended periods of siting, standing, and walking. Received XRAYS which revealed no acute fractures. She is not working.    [de-identified] : hep, pain mgmt, chiropractor

## 2024-12-26 NOTE — PHYSICAL EXAM
[Normal Coordination] : normal coordination [Normal DTR UE/LE] : normal DTR UE/LE  [Normal Sensation] : normal sensation [Normal Mood and Affect] : normal mood and affect [Oriented] : oriented [Able to Communicate] : able to communicate [Normal Skin] : normal skin [No Rash] : no rash [No Ulcers] : no ulcers [No Lesions] : no lesions [No obvious lymphadenopathy in areas examined] : no obvious lymphadenopathy in areas examined [Well Developed] : well developed [Peripheral vascular exam is grossly normal] : peripheral vascular exam is grossly normal [No Respiratory Distress] : no respiratory distress [Extension] : extension [4___] : left quadriceps 4[unfilled]/5 [3___] : left dorsiflexors 3[unfilled]/5 [5___] : right extensor hallicus longus 5[unfilled]/5 [de-identified] : Constitutional: - General Appearance: Unremarkable Body Habitus Well Developed Well Nourished Body Habitus No Deformities Well Groomed Ability To communicate: Normal Neurologic: Global sensation is intact to upper and lower extremities. See examination of Neck and/or Spine for exceptions. Orientation to Time, Place and Person is: Normal Mood And Affect is Normal Skin: - Head/Face, Right Upper/Lower Extremity, Left Upper/Lower Extremity: Normal See Examination of Neck and/or Spine for exceptions Cardiovascular: Peripheral Cardiovascular System is Normal Palpation of Lymph Nodes: Normal Palpation of lymph nodes in: Axilla, Cervical, Inguinal Abnormal Palpation of lymph nodes in: None  [] : non-antalgic [FreeTextEntry9] : Relief with Flexion

## 2024-12-26 NOTE — HISTORY OF PRESENT ILLNESS
[7] : 7 [Full time] : Work status: full time [] : yes [de-identified] : 12/23/2024:  Patient presenting today for a follow up visit. She continues to experience lower back pain with symptoms radiating into the distal lower extremity. She is using the anti-inflammatory tablet regularly along with the oxycodone. She is scheduled an additional injection with Dr. Nguyen for end of January 2025. She has been continuing with chiropractic treatment but does not feel this is giving her any significant benefit lately.   11/22/2024 - Patient presenting in follow up workers comp. She complains of intermittent left leg pain. Pain is worse with extended sitting. Additional LESI at the L5S1 level was approved, and she is in the process of setting up another injection. Started going to chiropractic therapy recently and still evaluating her response. Her medication regimen remains the same.   10/25/2024 - The patient is presenting for a workers' compensation follow-up. She complains of back and leg pain, with the back pain being more severe. The pain worsens throughout the workday and with bending. She received her first injection, but the second injection was denied. She plans to start chiropractic therapy shortly. She wishes to proceed with the indicated injection as recommended by Dr. Alvarez. Taking ibuprofen and oxycodone for symptom control which are both helpful. Voltaregn gel is also helpful for pain control during the day.   09/18/2024 - Patient presenting in follow up. Symptoms remain the same. Since the LESI she reports no real relief. She is awaiting to hear if a different injection procedure has been approved which was indicated by Dr. Alvarez. She continues home stretching exercises. Uses heat and ice for pain control. Taking oxycodone and ibuprofen as prescribed for symptom control. She is working. She is in the process of finding chiropractor who takes workers comp.   08/19/2024 - Patient returns to the office for follow up. She has received lumbar steroid injection. It has been 10 days since the injection. She does not feel any benefit. She has an appointment to follow up with Dr. Alvarez tomorrow to discuss potential additional injections or alternative procedures. She continues to use oxycodone and anti-inflammatory medication's. She has completed left surgery. She has not yet been able to establish chiropractic care with a provider who accepts her insurance  07/22/2024 - Pt presents in follow up. She complains of increase in left sided radicular leg pain. She had consult with Dr. Alvarez and she plans to move forward with LESI. Also feels chiropractic therapy may be beneficial in improving symptoms. Leg pain most prevalent while sleeping. Denies right sided leg pain. scheduled for left wrist surgery this Friday - not taking nsaids as a result. Takes oxycodone or Tylenol for control of symptoms.   06/21/2024 - Patient presenting in follow up. Patient reports no real changes. She complains of increased low back pain radiating into left leg. She is scheduled for left wrist surgery end of July. Takes oxycodone and ibuprofen for symptom control.   05/20/2024 - Patient presenting for follow up of back and leg pain. She primarily complains of pain in the left lower lumbar/buttock/hip region. She is managing her pain with oxycodone and ibuprofen as prescribed which is helpful. She is considering a LESI. No change to general medical health.   04/22/2024: Patient is here today for a follow up for back and leg pain. She states that she is currently experiencing a slight aggravation of her pain due to recent activity. She states that she experiences radiating pain when standing and leaning on the left side. She continues to perform at home exercises. Symptoms are consistent in intensity and location.   03/18/2024 - The patient reported experiencing both back and leg pain. She finds relief by taking ibuprofen daily and using oxycodone as prescribed at night. Although prior physical therapy was beneficial, it was discontinued due to a busy schedule; however, she continues to do the exercises at home. At this time, she is not interested in receiving interventional injections.  02/05/2024 - The patient is here for a follow-up, reporting constant low back pain along with pain radiating into the left leg. Despite these symptoms, she has returned to full-duty work and is effectively managing the pain. She takes ibuprofen and a muscle relaxer at night for relief. Additionally, she is actively participating in physical therapy, which has proven to be beneficial in managing her symptoms.  12/27/2023 - Patient presenting in follow up complaining of low back and left leg pain. She has been enrolled in physical therapy twice per week which is overall helpful. She has been out of work and feels ready to return. She is taking ibuprofen during the day and oxycodone at nighttime which is helpful for symptom control.   11/15/2023 - Patient presenting for MRI Review of L Spine. She primarily complains of left lower extremity pain/achiness worse with standing and weightbearing.   10/25/2023 - 45 y/o F presenting for initial evaluation of of low back pain, LLE numbness and weakness after a work-related injury on 9/12/23. 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 Landed onto her left side, wrist, and back. Symptom wise, since the accident she reports axial low back pain L > R. Also, complaints of intermittent numbness/tingling into the left leg. Denies any changes in LE strength. Wrist is being treated, she is wearing splint and received injections. She reports history of laminectomy a few years prior which was overall helpful. Weakness was never apparent in the past, and has been present since the injury. Treated with MDP and Tylenol without much relief. Pain becomes worse with extended periods of siting, standing, and walking. Received XRAYS which revealed no acute fractures. She is not working.    [de-identified] : hep, pain mgmt, chiropractor

## 2024-12-26 NOTE — DISCUSSION/SUMMARY
[de-identified] : Medications were renewed in the office today. Recommend she withhold from chiropractic treatment over the next several weeks to see if she is feeling any different. She feels that it's possible to chiropractic manipulation is only making things worse. She's eager to obtain any additional benefit from the scheduled stairway injection. Medial branch blocks were discussed. This may be a modality to consider following the steroid injection. She will follow up in six weeks, which should be approximately two weeks after scheduled steroid injection  Prior to appointment and during encounter with patient extensive medical records were reviewed including but not limited to, hospital records, outpatient records, imaging results, and lab data.During this appointment the patient was examined, diagnoses were discussed and explained in a face to face manner. In addition extensive time was spent reviewing aforementioned diagnostic studies. Counseling including abnormal image results, differential diagnoses, treatment options, risk and benefits, lifestyle changes, current condition, and current medications was performed. Patient's comments, questions, and concerns were addressed and patient verbalized understanding. Based on this patient's presentation at our office, which is an orthopedic spine surgeon's office, this patient inherently / intrinsically has a risk, however minute, of developing issues such as Cauda equina syndrome, bowel and bladder changes, or progression of motor or neurological deficits such as paralysis which may be permanent.  Joseluis Craft Acting as a Scribe for Joseluis Malik, attest that this documentation has been prepared under the direction and in the presence of Provider Moisés Diggs MD.

## 2025-01-29 NOTE — DISCUSSION/SUMMARY
[Medication Risks Reviewed] : Medication risks reviewed [de-identified] : Discussed continued medical mgmt and exercise based rehabilitation. Referral issued for acupuncture. Medication was renewed in the office today. Patient will follow through with LESI scheduled tomorrow with patient management. FUV 4-6 weeks.   Prior to appointment and during encounter with patient extensive medical records were reviewed including but not limited to, hospital records, outpatient records, imaging results, and lab data.During this appointment the patient was examined, diagnoses were discussed and explained in a face to face manner. In addition extensive time was spent reviewing aforementioned diagnostic studies. Counseling including abnormal image results, differential diagnoses, treatment options, risk and benefits, lifestyle changes, current condition, and current medications was performed. Patient's comments, questions, and concerns were addressed and patient verbalized understanding. Based on this patient's presentation at our office, which is an orthopedic spine surgeon's office, this patient inherently / intrinsically has a risk, however minute, of developing issues such as Cauda equina syndrome, bowel and bladder changes, or progression of motor or neurological deficits such as paralysis which may be permanent.  MEGHANA SERRANO Acting as a Scribe for Dr. Dontae ALVAREZ, Meghana Serrano, attest that this documentation has been prepared under the direction and in the presence of Provider Moisés Diggs MD.

## 2025-01-29 NOTE — DISCUSSION/SUMMARY
[Medication Risks Reviewed] : Medication risks reviewed [de-identified] : Discussed continued medical mgmt and exercise based rehabilitation. Referral issued for acupuncture. Medication was renewed in the office today. Patient will follow through with LESI scheduled tomorrow with patient management. FUV 4-6 weeks.   Prior to appointment and during encounter with patient extensive medical records were reviewed including but not limited to, hospital records, outpatient records, imaging results, and lab data.During this appointment the patient was examined, diagnoses were discussed and explained in a face to face manner. In addition extensive time was spent reviewing aforementioned diagnostic studies. Counseling including abnormal image results, differential diagnoses, treatment options, risk and benefits, lifestyle changes, current condition, and current medications was performed. Patient's comments, questions, and concerns were addressed and patient verbalized understanding. Based on this patient's presentation at our office, which is an orthopedic spine surgeon's office, this patient inherently / intrinsically has a risk, however minute, of developing issues such as Cauda equina syndrome, bowel and bladder changes, or progression of motor or neurological deficits such as paralysis which may be permanent.  MEGHANA SERRANO Acting as a Scribe for Dr. Dontae ALVAREZ, Meghana Serrano, attest that this documentation has been prepared under the direction and in the presence of Provider Moisés Diggs MD.

## 2025-01-29 NOTE — PHYSICAL EXAM
[Normal Coordination] : normal coordination [Normal DTR UE/LE] : normal DTR UE/LE  [Normal Sensation] : normal sensation [Normal Mood and Affect] : normal mood and affect [Oriented] : oriented [Able to Communicate] : able to communicate [Normal Skin] : normal skin [No Rash] : no rash [No Ulcers] : no ulcers [No Lesions] : no lesions [No obvious lymphadenopathy in areas examined] : no obvious lymphadenopathy in areas examined [Well Developed] : well developed [Peripheral vascular exam is grossly normal] : peripheral vascular exam is grossly normal [No Respiratory Distress] : no respiratory distress [Extension] : extension [4___] : left quadriceps 4[unfilled]/5 [3___] : left dorsiflexors 3[unfilled]/5 [5___] : right extensor hallicus longus 5[unfilled]/5 [de-identified] : Constitutional: - General Appearance: Unremarkable Body Habitus Well Developed Well Nourished Body Habitus No Deformities Well Groomed Ability To communicate: Normal Neurologic: Global sensation is intact to upper and lower extremities. See examination of Neck and/or Spine for exceptions. Orientation to Time, Place and Person is: Normal Mood And Affect is Normal Skin: - Head/Face, Right Upper/Lower Extremity, Left Upper/Lower Extremity: Normal See Examination of Neck and/or Spine for exceptions Cardiovascular: Peripheral Cardiovascular System is Normal Palpation of Lymph Nodes: Normal Palpation of lymph nodes in: Axilla, Cervical, Inguinal Abnormal Palpation of lymph nodes in: None  [] : non-antalgic [FreeTextEntry9] : Relief with Flexion

## 2025-01-29 NOTE — HISTORY OF PRESENT ILLNESS
[8] : 8 [Full time] : Work status: full time [] : yes [de-identified] : 01/27/2025 - Patient presenting for workers compensation follow up. She complains of persistent leg tingling in her left shin, as well as left buttock pain radiating throughout entire leg into ankle. She is scheduled for LESI tomorrow. Taking oxycodone as prescribed for pain management. Continues lumbar HEP, completed PT. Stopped chiropractic care due to increase in pain.   12/23/2024:  Patient presenting today for a follow up visit. She continues to experience lower back pain with symptoms radiating into the distal lower extremity. She is using the anti-inflammatory tablet regularly along with the oxycodone. She is scheduled an additional injection with Dr. Nguyen for end of January 2025. She has been continuing with chiropractic treatment but does not feel this is giving her any significant benefit lately.   11/22/2024 - Patient presenting in follow up workers comp. She complains of intermittent left leg pain. Pain is worse with extended sitting. Additional LESI at the L5S1 level was approved, and she is in the process of setting up another injection. Started going to chiropractic therapy recently and still evaluating her response. Her medication regimen remains the same.   10/25/2024 - The patient is presenting for a workers' compensation follow-up. She complains of back and leg pain, with the back pain being more severe. The pain worsens throughout the workday and with bending. She received her first injection, but the second injection was denied. She plans to start chiropractic therapy shortly. She wishes to proceed with the indicated injection as recommended by Dr. Alvarez. Taking ibuprofen and oxycodone for symptom control which are both helpful. Voltaregn gel is also helpful for pain control during the day.   09/18/2024 - Patient presenting in follow up. Symptoms remain the same. Since the LESI she reports no real relief. She is awaiting to hear if a different injection procedure has been approved which was indicated by Dr. Alvarez. She continues home stretching exercises. Uses heat and ice for pain control. Taking oxycodone and ibuprofen as prescribed for symptom control. She is working. She is in the process of finding chiropractor who takes workers comp.   08/19/2024 - Patient returns to the office for follow up. She has received lumbar steroid injection. It has been 10 days since the injection. She does not feel any benefit. She has an appointment to follow up with Dr. Alvarez tomorrow to discuss potential additional injections or alternative procedures. She continues to use oxycodone and anti-inflammatory medication's. She has completed left surgery. She has not yet been able to establish chiropractic care with a provider who accepts her insurance  07/22/2024 - Pt presents in follow up. She complains of increase in left sided radicular leg pain. She had consult with Dr. Alvarez and she plans to move forward with LESI. Also feels chiropractic therapy may be beneficial in improving symptoms. Leg pain most prevalent while sleeping. Denies right sided leg pain. scheduled for left wrist surgery this Friday - not taking nsaids as a result. Takes oxycodone or Tylenol for control of symptoms.   06/21/2024 - Patient presenting in follow up. Patient reports no real changes. She complains of increased low back pain radiating into left leg. She is scheduled for left wrist surgery end of July. Takes oxycodone and ibuprofen for symptom control.   05/20/2024 - Patient presenting for follow up of back and leg pain. She primarily complains of pain in the left lower lumbar/buttock/hip region. She is managing her pain with oxycodone and ibuprofen as prescribed which is helpful. She is considering a LESI. No change to general medical health.   04/22/2024: Patient is here today for a follow up for back and leg pain. She states that she is currently experiencing a slight aggravation of her pain due to recent activity. She states that she experiences radiating pain when standing and leaning on the left side. She continues to perform at home exercises. Symptoms are consistent in intensity and location.   03/18/2024 - The patient reported experiencing both back and leg pain. She finds relief by taking ibuprofen daily and using oxycodone as prescribed at night. Although prior physical therapy was beneficial, it was discontinued due to a busy schedule; however, she continues to do the exercises at home. At this time, she is not interested in receiving interventional injections.  02/05/2024 - The patient is here for a follow-up, reporting constant low back pain along with pain radiating into the left leg. Despite these symptoms, she has returned to full-duty work and is effectively managing the pain. She takes ibuprofen and a muscle relaxer at night for relief. Additionally, she is actively participating in physical therapy, which has proven to be beneficial in managing her symptoms.  12/27/2023 - Patient presenting in follow up complaining of low back and left leg pain. She has been enrolled in physical therapy twice per week which is overall helpful. She has been out of work and feels ready to return. She is taking ibuprofen during the day and oxycodone at nighttime which is helpful for symptom control.   11/15/2023 - Patient presenting for MRI Review of L Spine. She primarily complains of left lower extremity pain/achiness worse with standing and weightbearing.   10/25/2023 - 43 y/o F presenting for initial evaluation of of low back pain, LLE numbness and weakness after a work-related injury on 9/12/23. 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 Landed onto her left side, wrist, and back. Symptom wise, since the accident she reports axial low back pain L > R. Also, complaints of intermittent numbness/tingling into the left leg. Denies any changes in LE strength. Wrist is being treated, she is wearing splint and received injections. She reports history of laminectomy a few years prior which was overall helpful. Weakness was never apparent in the past, and has been present since the injury. Treated with MDP and Tylenol without much relief. Pain becomes worse with extended periods of siting, standing, and walking. Received XRAYS which revealed no acute fractures. She is not working.    [de-identified] : hep

## 2025-01-29 NOTE — PHYSICAL EXAM
[Normal Coordination] : normal coordination [Normal DTR UE/LE] : normal DTR UE/LE  [Normal Sensation] : normal sensation [Normal Mood and Affect] : normal mood and affect [Oriented] : oriented [Able to Communicate] : able to communicate [Normal Skin] : normal skin [No Rash] : no rash [No Ulcers] : no ulcers [No Lesions] : no lesions [No obvious lymphadenopathy in areas examined] : no obvious lymphadenopathy in areas examined [Well Developed] : well developed [Peripheral vascular exam is grossly normal] : peripheral vascular exam is grossly normal [No Respiratory Distress] : no respiratory distress [Extension] : extension [4___] : left quadriceps 4[unfilled]/5 [3___] : left dorsiflexors 3[unfilled]/5 [5___] : right extensor hallicus longus 5[unfilled]/5 [de-identified] : Constitutional: - General Appearance: Unremarkable Body Habitus Well Developed Well Nourished Body Habitus No Deformities Well Groomed Ability To communicate: Normal Neurologic: Global sensation is intact to upper and lower extremities. See examination of Neck and/or Spine for exceptions. Orientation to Time, Place and Person is: Normal Mood And Affect is Normal Skin: - Head/Face, Right Upper/Lower Extremity, Left Upper/Lower Extremity: Normal See Examination of Neck and/or Spine for exceptions Cardiovascular: Peripheral Cardiovascular System is Normal Palpation of Lymph Nodes: Normal Palpation of lymph nodes in: Axilla, Cervical, Inguinal Abnormal Palpation of lymph nodes in: None  [] : non-antalgic [FreeTextEntry9] : Relief with Flexion

## 2025-01-29 NOTE — HISTORY OF PRESENT ILLNESS
[8] : 8 [Full time] : Work status: full time [] : yes [de-identified] : 01/27/2025 - Patient presenting for workers compensation follow up. She complains of persistent leg tingling in her left shin, as well as left buttock pain radiating throughout entire leg into ankle. She is scheduled for LESI tomorrow. Taking oxycodone as prescribed for pain management. Continues lumbar HEP, completed PT. Stopped chiropractic care due to increase in pain.   12/23/2024:  Patient presenting today for a follow up visit. She continues to experience lower back pain with symptoms radiating into the distal lower extremity. She is using the anti-inflammatory tablet regularly along with the oxycodone. She is scheduled an additional injection with Dr. Nguyen for end of January 2025. She has been continuing with chiropractic treatment but does not feel this is giving her any significant benefit lately.   11/22/2024 - Patient presenting in follow up workers comp. She complains of intermittent left leg pain. Pain is worse with extended sitting. Additional LESI at the L5S1 level was approved, and she is in the process of setting up another injection. Started going to chiropractic therapy recently and still evaluating her response. Her medication regimen remains the same.   10/25/2024 - The patient is presenting for a workers' compensation follow-up. She complains of back and leg pain, with the back pain being more severe. The pain worsens throughout the workday and with bending. She received her first injection, but the second injection was denied. She plans to start chiropractic therapy shortly. She wishes to proceed with the indicated injection as recommended by Dr. Alvarez. Taking ibuprofen and oxycodone for symptom control which are both helpful. Voltaregn gel is also helpful for pain control during the day.   09/18/2024 - Patient presenting in follow up. Symptoms remain the same. Since the LESI she reports no real relief. She is awaiting to hear if a different injection procedure has been approved which was indicated by Dr. Alvarez. She continues home stretching exercises. Uses heat and ice for pain control. Taking oxycodone and ibuprofen as prescribed for symptom control. She is working. She is in the process of finding chiropractor who takes workers comp.   08/19/2024 - Patient returns to the office for follow up. She has received lumbar steroid injection. It has been 10 days since the injection. She does not feel any benefit. She has an appointment to follow up with Dr. Alvarez tomorrow to discuss potential additional injections or alternative procedures. She continues to use oxycodone and anti-inflammatory medication's. She has completed left surgery. She has not yet been able to establish chiropractic care with a provider who accepts her insurance  07/22/2024 - Pt presents in follow up. She complains of increase in left sided radicular leg pain. She had consult with Dr. Alvarez and she plans to move forward with LESI. Also feels chiropractic therapy may be beneficial in improving symptoms. Leg pain most prevalent while sleeping. Denies right sided leg pain. scheduled for left wrist surgery this Friday - not taking nsaids as a result. Takes oxycodone or Tylenol for control of symptoms.   06/21/2024 - Patient presenting in follow up. Patient reports no real changes. She complains of increased low back pain radiating into left leg. She is scheduled for left wrist surgery end of July. Takes oxycodone and ibuprofen for symptom control.   05/20/2024 - Patient presenting for follow up of back and leg pain. She primarily complains of pain in the left lower lumbar/buttock/hip region. She is managing her pain with oxycodone and ibuprofen as prescribed which is helpful. She is considering a LESI. No change to general medical health.   04/22/2024: Patient is here today for a follow up for back and leg pain. She states that she is currently experiencing a slight aggravation of her pain due to recent activity. She states that she experiences radiating pain when standing and leaning on the left side. She continues to perform at home exercises. Symptoms are consistent in intensity and location.   03/18/2024 - The patient reported experiencing both back and leg pain. She finds relief by taking ibuprofen daily and using oxycodone as prescribed at night. Although prior physical therapy was beneficial, it was discontinued due to a busy schedule; however, she continues to do the exercises at home. At this time, she is not interested in receiving interventional injections.  02/05/2024 - The patient is here for a follow-up, reporting constant low back pain along with pain radiating into the left leg. Despite these symptoms, she has returned to full-duty work and is effectively managing the pain. She takes ibuprofen and a muscle relaxer at night for relief. Additionally, she is actively participating in physical therapy, which has proven to be beneficial in managing her symptoms.  12/27/2023 - Patient presenting in follow up complaining of low back and left leg pain. She has been enrolled in physical therapy twice per week which is overall helpful. She has been out of work and feels ready to return. She is taking ibuprofen during the day and oxycodone at nighttime which is helpful for symptom control.   11/15/2023 - Patient presenting for MRI Review of L Spine. She primarily complains of left lower extremity pain/achiness worse with standing and weightbearing.   10/25/2023 - 43 y/o F presenting for initial evaluation of of low back pain, LLE numbness and weakness after a work-related injury on 9/12/23. 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 Landed onto her left side, wrist, and back. Symptom wise, since the accident she reports axial low back pain L > R. Also, complaints of intermittent numbness/tingling into the left leg. Denies any changes in LE strength. Wrist is being treated, she is wearing splint and received injections. She reports history of laminectomy a few years prior which was overall helpful. Weakness was never apparent in the past, and has been present since the injury. Treated with MDP and Tylenol without much relief. Pain becomes worse with extended periods of siting, standing, and walking. Received XRAYS which revealed no acute fractures. She is not working.    [de-identified] : hep

## 2025-02-10 NOTE — PHYSICAL EXAM
[de-identified] : Constitutional:   - No acute distress   - Well developed; well nourished    Neurological:   - normal mood and affect   - alert and oriented x 3     Cardiovascular:   - grossly normal   Lumbar Spine Exam:   Inspection: erythema (-) ecchymosis (-) rashes (-) alignment: no scoliosis Well healed surgical scar midline  Palpation: Midline lumbar tenderness:            (-) midline thoracic tenderness:          (-) Lumbar paraspinal tenderness:  L (+) ; R (-) thoracic paraspinal tenderness: L (-) ; R (-) sciatic nerve tenderness:           L (+) ; R (-) SI joint tenderness:                     L (-) ; R (-) GTB tenderness:                        L (-);  R (-)   ROM: WNL; stiffness throughout ROM testing pain with extremes of flexion/extension   Strength:                                    Right       Left    Hip Flexion:                (5/5)       (5/5) Quadriceps:               (5/5)       (5/5) Hamstrings:                (5/5)       (5/5) Ankle Dorsiflexion:     (5/5)       (4/5) EHL:                           (5/5)       (4/5) Ankle Plantarflexion:  (5/5)       (5/5)   Special Tests: SLR:                            R (-) ; L (+) Facet loading:             R (-) ; L (-) LORENA test:                R (N/A) ; L (N/A) Hamstring tightness:   R (-);  L (-)   Neurologic: SILT throughout right lower extremity SILT throughout left lower extremity with exception of left L5 distribution   Reflexes normal and symmetric bilateral lower extremities   Gait: non- antalgic gait ambulates without assistive device

## 2025-02-10 NOTE — PHYSICAL EXAM
[de-identified] : Constitutional:   - No acute distress   - Well developed; well nourished    Neurological:   - normal mood and affect   - alert and oriented x 3     Cardiovascular:   - grossly normal   Lumbar Spine Exam:   Inspection: erythema (-) ecchymosis (-) rashes (-) alignment: no scoliosis Well healed surgical scar midline  Palpation: Midline lumbar tenderness:            (-) midline thoracic tenderness:          (-) Lumbar paraspinal tenderness:  L (+) ; R (-) thoracic paraspinal tenderness: L (-) ; R (-) sciatic nerve tenderness:           L (+) ; R (-) SI joint tenderness:                     L (-) ; R (-) GTB tenderness:                        L (-);  R (-)   ROM: WNL; stiffness throughout ROM testing pain with extremes of flexion/extension   Strength:                                    Right       Left    Hip Flexion:                (5/5)       (5/5) Quadriceps:               (5/5)       (5/5) Hamstrings:                (5/5)       (5/5) Ankle Dorsiflexion:     (5/5)       (4/5) EHL:                           (5/5)       (4/5) Ankle Plantarflexion:  (5/5)       (5/5)   Special Tests: SLR:                            R (-) ; L (+) Facet loading:             R (-) ; L (-) LORENA test:                R (N/A) ; L (N/A) Hamstring tightness:   R (-);  L (-)   Neurologic: SILT throughout right lower extremity SILT throughout left lower extremity with exception of left L5 distribution   Reflexes normal and symmetric bilateral lower extremities   Gait: non- antalgic gait ambulates without assistive device

## 2025-02-10 NOTE — HISTORY OF PRESENT ILLNESS
[FreeTextEntry1] : 2025 - Patient presents for FUV after a left L5-S1, S1 TFESI on 2025. Patient reports  2024 - Patient presents for 3-month W/C FUV regarding their low back pain. Patient reports although previously reported minimal relief with DANITZA on 24, she now notes that she did in fact receive >50% reduction in pain for at least 8 weeks. 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 She found significant improvement in her functionality and quality of life. Her pain gradually returned to baseline and is now impacting her ADLs and functionality.  She presents today to discuss next steps in her treatment plan with the hope of decreasing her pain and increasing her functionality and quality of life.   2024 - Patient presents for FUV after a Left L5-S1 TFESI on 24.  She reports minimal relief after the procedure.  Still continues to complain of low back pain with radiation to the left lower extremity extending to the calf.  Patient remains on ibuprofen and oxycodone as needed.  The patient presents for initial W/C evaluation regarding their low back pain. Patient was referred by Dr. Diggs. 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 Patient had a L5-S1 laminectomy/discectomy about 5 years ago which had resolver her radicular leg pain at the time.  After the work injury her pain is across the lower back with radiation down the left lower extremity; this is markedly worse than prior.  Of note, patient is scheduled for left wrist surgery in the upcoming weeks.  Patient had missed several months following her injury have returned to a full duty capacity in 2024.  WC DOI: 2023 Occupation: Teacher Working status: Full duty  Injections: 1) Left L5-S1 TFESI (2024) 2) Left L5-S1, S1 TFESI (2025)  Pertinent Surgical History: 1) L5-S1 Discectomy (~5 years ago) - Dr. Trejo  Imagin) MRI Lumbar Spine (2023) - [facility]  At L1-2: No significant spinal canal or neural foraminal stenosis. At L2-3: No significant spinal canal or neural foraminal stenosis. At L3-4: No significant spinal canal or neural foraminal stenosis. At L4-5: No significant spinal canal or neural foraminal stenosis. At L5-S1: Bilateral hemilaminectomy changes are noted. There is grade 1 anterolisthesis of L5 on S1 with uncovering of the intervertebral disc. There is a superimposed disc bulge with bilateral facet arthrosis resulting in severe left with impingement of the exiting L5 nerve root and mild right-sided neural foraminal narrowing.    Physician Disclaimer: I have personally reviewed and confirmed all HPI data with the patient.

## 2025-02-10 NOTE — WORK
[Severe Partial] : severe partial [Reveals pre-existing condition(s) that may affect treatment/prognosis] : reveals pre-existing condition(s) that may affect treatment/prognosis [I provided the services listed above] :  I provided the services listed above. [FreeTextEntry2] : Injury is an exacerbation of underlying chronic condition [FreeTextEntry3] : Degree of impairment above with regard to lumbar spine only

## 2025-02-10 NOTE — ASSESSMENT
[FreeTextEntry1] : A discussion regarding available pain management treatment options occurred with the patient.  These included interventional, rehabilitative, pharmacological, and alternative modalities. We will proceed with the following:    Interventional treatment options:   - Proceed with left L5-S1, S1 TFESI (80 mg Kenalog) with fluoroscopic guidance - Patient experienced a significant reduction of her pain and overall improved functionality following her initial DANITZA - see additional instructions below    Rehabilitative options:   - Completed physical therapy trial - participation in active HEP was discussed and encouraged as tolerated  Medication based treatment options:   - Continue ibuprofen 400-600 mg up to TID as needed - Patient on chronic opioid therapy; once again discussed risks of habituation, tolerance, and dependence - see additional instructions below    Complementary treatment options:   - Weight management and lifestyle modifications discussed  Additional treatment recommendations as follows:   - patient will follow-up with Dr. Diggs as directed - Follow up for indicated procedure or 6 weeks  The risks, benefits and alternatives of the proposed procedure were explained in detail with the patient. The risks outlined include but are not limited to infection, bleeding, post- dural puncture headache, nerve injury, a temporary increase in pain, failure to resolve symptoms, need for future interventions, allergic reaction, and possible elevation of blood sugar in diabetics if using corticosteroid.  All questions were answered to patient's apparent satisfaction, and he/she verbalized an understanding.  We have discussed the risks, benefits, and alternatives for NSAID therapy including but not limited to the risk of bleeding, thrombosis, gastric mucosal irritation/ulceration, allergic reaction and kidney dysfunction.  The patient verbalizes an understanding.  I, Mary Kate Alejandre, acting as scribe, attest that this documentation has been prepared under the direction and in the presence of Provider Gennaro Alvarez DO.  The documentation recorded by the scribe, in my presence, accurately reflects the service I personally performed, and the decisions made by me with my edits as appropriate.

## 2025-03-02 NOTE — PHYSICAL EXAM
[de-identified] : Constitutional: - General Appearance: Unremarkable Body Habitus Well Developed Well Nourished Body Habitus No Deformities Well Groomed Ability To communicate: Normal Neurologic: Global sensation is intact to upper and lower extremities. See examination of Neck and/or Spine for exceptions. Orientation to Time, Place and Person is: Normal Mood And Affect is Normal Skin: - Head/Face, Right Upper/Lower Extremity, Left Upper/Lower Extremity: Normal See Examination of Neck and/or Spine for exceptions Cardiovascular: Peripheral Cardiovascular System is Normal Palpation of Lymph Nodes: Normal Palpation of lymph nodes in: Axilla, Cervical, Inguinal Abnormal Palpation of lymph nodes in: None  [] : non-antalgic [FreeTextEntry9] : Relief with Flexion

## 2025-03-02 NOTE — PHYSICAL EXAM
[de-identified] : Constitutional: - General Appearance: Unremarkable Body Habitus Well Developed Well Nourished Body Habitus No Deformities Well Groomed Ability To communicate: Normal Neurologic: Global sensation is intact to upper and lower extremities. See examination of Neck and/or Spine for exceptions. Orientation to Time, Place and Person is: Normal Mood And Affect is Normal Skin: - Head/Face, Right Upper/Lower Extremity, Left Upper/Lower Extremity: Normal See Examination of Neck and/or Spine for exceptions Cardiovascular: Peripheral Cardiovascular System is Normal Palpation of Lymph Nodes: Normal Palpation of lymph nodes in: Axilla, Cervical, Inguinal Abnormal Palpation of lymph nodes in: None  [] : non-antalgic [FreeTextEntry9] : Relief with Flexion

## 2025-03-02 NOTE — HISTORY OF PRESENT ILLNESS
[de-identified] : 02/24/2025 - Patient returns to the office for a routine follow. She has recently followed up with interventional pain management and reports she has received authorization for lumbar medial branch blocks and facet ablation. She is eager to schedule as soon as availability permits. To continue to utilize oxycodone and anti-inflammatories.  01/27/2025 - Patient presenting for workers compensation follow up. She complains of persistent leg tingling in her left shin, as well as left buttock pain radiating throughout entire leg into ankle. She is scheduled for LESI tomorrow. Taking oxycodone as prescribed for pain management. Continues lumbar HEP, completed PT. Stopped chiropractic care due to increase in pain.   12/23/2024:  Patient presenting today for a follow up visit. She continues to experience lower back pain with symptoms radiating into the distal lower extremity. She is using the anti-inflammatory tablet regularly along with the oxycodone. She is scheduled an additional injection with Dr. Nguyen for end of January 2025. She has been continuing with chiropractic treatment but does not feel this is giving her any significant benefit lately.   11/22/2024 - Patient presenting in follow up workers comp. She complains of intermittent left leg pain. Pain is worse with extended sitting. Additional LESI at the L5S1 level was approved, and she is in the process of setting up another injection. Started going to chiropractic therapy recently and still evaluating her response. Her medication regimen remains the same.   10/25/2024 - The patient is presenting for a workers' compensation follow-up. She complains of back and leg pain, with the back pain being more severe. The pain worsens throughout the workday and with bending. She received her first injection, but the second injection was denied. She plans to start chiropractic therapy shortly. She wishes to proceed with the indicated injection as recommended by Dr. Alvarez. Taking ibuprofen and oxycodone for symptom control which are both helpful. Voltaregn gel is also helpful for pain control during the day.   09/18/2024 - Patient presenting in follow up. Symptoms remain the same. Since the LESI she reports no real relief. She is awaiting to hear if a different injection procedure has been approved which was indicated by Dr. Alvarez. She continues home stretching exercises. Uses heat and ice for pain control. Taking oxycodone and ibuprofen as prescribed for symptom control. She is working. She is in the process of finding chiropractor who takes workers comp.   08/19/2024 - Patient returns to the office for follow up. She has received lumbar steroid injection. It has been 10 days since the injection. She does not feel any benefit. She has an appointment to follow up with Dr. Alvarez tomorrow to discuss potential additional injections or alternative procedures. She continues to use oxycodone and anti-inflammatory medication's. She has completed left surgery. She has not yet been able to establish chiropractic care with a provider who accepts her insurance  07/22/2024 - Pt presents in follow up. She complains of increase in left sided radicular leg pain. She had consult with Dr. Alvarez and she plans to move forward with LESI. Also feels chiropractic therapy may be beneficial in improving symptoms. Leg pain most prevalent while sleeping. Denies right sided leg pain. scheduled for left wrist surgery this Friday - not taking nsaids as a result. Takes oxycodone or Tylenol for control of symptoms.   06/21/2024 - Patient presenting in follow up. Patient reports no real changes. She complains of increased low back pain radiating into left leg. She is scheduled for left wrist surgery end of July. Takes oxycodone and ibuprofen for symptom control.   05/20/2024 - Patient presenting for follow up of back and leg pain. She primarily complains of pain in the left lower lumbar/buttock/hip region. She is managing her pain with oxycodone and ibuprofen as prescribed which is helpful. She is considering a LESI. No change to general medical health.   04/22/2024: Patient is here today for a follow up for back and leg pain. She states that she is currently experiencing a slight aggravation of her pain due to recent activity. She states that she experiences radiating pain when standing and leaning on the left side. She continues to perform at home exercises. Symptoms are consistent in intensity and location.   03/18/2024 - The patient reported experiencing both back and leg pain. She finds relief by taking ibuprofen daily and using oxycodone as prescribed at night. Although prior physical therapy was beneficial, it was discontinued due to a busy schedule; however, she continues to do the exercises at home. At this time, she is not interested in receiving interventional injections.  02/05/2024 - The patient is here for a follow-up, reporting constant low back pain along with pain radiating into the left leg. Despite these symptoms, she has returned to full-duty work and is effectively managing the pain. She takes ibuprofen and a muscle relaxer at night for relief. Additionally, she is actively participating in physical therapy, which has proven to be beneficial in managing her symptoms.  12/27/2023 - Patient presenting in follow up complaining of low back and left leg pain. She has been enrolled in physical therapy twice per week which is overall helpful. She has been out of work and feels ready to return. She is taking ibuprofen during the day and oxycodone at nighttime which is helpful for symptom control.   11/15/2023 - Patient presenting for MRI Review of L Spine. She primarily complains of left lower extremity pain/achiness worse with standing and weightbearing.   10/25/2023 - 45 y/o F presenting for initial evaluation of of low back pain, LLE numbness and weakness after a work-related injury on 9/12/23. 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 Landed onto her left side, wrist, and back. Symptom wise, since the accident she reports axial low back pain L > R. Also, complaints of intermittent numbness/tingling into the left leg. Denies any changes in LE strength. Wrist is being treated, she is wearing splint and received injections. She reports history of laminectomy a few years prior which was overall helpful. Weakness was never apparent in the past, and has been present since the injury. Treated with MDP and Tylenol without much relief. Pain becomes worse with extended periods of siting, standing, and walking. Received XRAYS which revealed no acute fractures. She is not working.    [de-identified] : pain mgmt-inj, chiropractic, hep

## 2025-03-02 NOTE — DISCUSSION/SUMMARY
[de-identified] : Medications were renewed today. She will follow up with the interventional pain management for the medial branch blocks and potential RFA depending on her response. Otherwise, she will follow up in the office in one months time.  Prior to appointment and during encounter with patient extensive medical records were reviewed including but not limited to, hospital records, outpatient records, imaging results, and lab data.During this appointment the patient was examined, diagnoses were discussed and explained in a face to face manner. In addition extensive time was spent reviewing aforementioned diagnostic studies. Counseling including abnormal image results, differential diagnoses, treatment options, risk and benefits, lifestyle changes, current condition, and current medications was performed. Patient's comments, questions, and concerns were addressed and patient verbalized understanding. Based on this patient's presentation at our office, which is an orthopedic spine surgeon's office, this patient inherently / intrinsically has a risk, however minute, of developing issues such as Cauda equina syndrome, bowel and bladder changes, or progression of motor or neurological deficits such as paralysis which may be permanent.   I, [BRIAN Bran], certify that the clinical information was reviewed with Dr. Diggs, who was physically present in the office. He agreed with the above plan of care and was available for consultation as necessary during the office visit.

## 2025-03-02 NOTE — DISCUSSION/SUMMARY
[de-identified] : Medications were renewed today. She will follow up with the interventional pain management for the medial branch blocks and potential RFA depending on her response. Otherwise, she will follow up in the office in one months time.  Prior to appointment and during encounter with patient extensive medical records were reviewed including but not limited to, hospital records, outpatient records, imaging results, and lab data.During this appointment the patient was examined, diagnoses were discussed and explained in a face to face manner. In addition extensive time was spent reviewing aforementioned diagnostic studies. Counseling including abnormal image results, differential diagnoses, treatment options, risk and benefits, lifestyle changes, current condition, and current medications was performed. Patient's comments, questions, and concerns were addressed and patient verbalized understanding. Based on this patient's presentation at our office, which is an orthopedic spine surgeon's office, this patient inherently / intrinsically has a risk, however minute, of developing issues such as Cauda equina syndrome, bowel and bladder changes, or progression of motor or neurological deficits such as paralysis which may be permanent.   I, [BRIAN Bran], certify that the clinical information was reviewed with Dr. Diggs, who was physically present in the office. He agreed with the above plan of care and was available for consultation as necessary during the office visit.

## 2025-03-02 NOTE — HISTORY OF PRESENT ILLNESS
[de-identified] : 02/24/2025 - Patient returns to the office for a routine follow. She has recently followed up with interventional pain management and reports she has received authorization for lumbar medial branch blocks and facet ablation. She is eager to schedule as soon as availability permits. To continue to utilize oxycodone and anti-inflammatories.  01/27/2025 - Patient presenting for workers compensation follow up. She complains of persistent leg tingling in her left shin, as well as left buttock pain radiating throughout entire leg into ankle. She is scheduled for LESI tomorrow. Taking oxycodone as prescribed for pain management. Continues lumbar HEP, completed PT. Stopped chiropractic care due to increase in pain.   12/23/2024:  Patient presenting today for a follow up visit. She continues to experience lower back pain with symptoms radiating into the distal lower extremity. She is using the anti-inflammatory tablet regularly along with the oxycodone. She is scheduled an additional injection with Dr. Nguyen for end of January 2025. She has been continuing with chiropractic treatment but does not feel this is giving her any significant benefit lately.   11/22/2024 - Patient presenting in follow up workers comp. She complains of intermittent left leg pain. Pain is worse with extended sitting. Additional LESI at the L5S1 level was approved, and she is in the process of setting up another injection. Started going to chiropractic therapy recently and still evaluating her response. Her medication regimen remains the same.   10/25/2024 - The patient is presenting for a workers' compensation follow-up. She complains of back and leg pain, with the back pain being more severe. The pain worsens throughout the workday and with bending. She received her first injection, but the second injection was denied. She plans to start chiropractic therapy shortly. She wishes to proceed with the indicated injection as recommended by Dr. Alvarez. Taking ibuprofen and oxycodone for symptom control which are both helpful. Voltaregn gel is also helpful for pain control during the day.   09/18/2024 - Patient presenting in follow up. Symptoms remain the same. Since the LESI she reports no real relief. She is awaiting to hear if a different injection procedure has been approved which was indicated by Dr. Alvarez. She continues home stretching exercises. Uses heat and ice for pain control. Taking oxycodone and ibuprofen as prescribed for symptom control. She is working. She is in the process of finding chiropractor who takes workers comp.   08/19/2024 - Patient returns to the office for follow up. She has received lumbar steroid injection. It has been 10 days since the injection. She does not feel any benefit. She has an appointment to follow up with Dr. Alvarez tomorrow to discuss potential additional injections or alternative procedures. She continues to use oxycodone and anti-inflammatory medication's. She has completed left surgery. She has not yet been able to establish chiropractic care with a provider who accepts her insurance  07/22/2024 - Pt presents in follow up. She complains of increase in left sided radicular leg pain. She had consult with Dr. Alvarez and she plans to move forward with LESI. Also feels chiropractic therapy may be beneficial in improving symptoms. Leg pain most prevalent while sleeping. Denies right sided leg pain. scheduled for left wrist surgery this Friday - not taking nsaids as a result. Takes oxycodone or Tylenol for control of symptoms.   06/21/2024 - Patient presenting in follow up. Patient reports no real changes. She complains of increased low back pain radiating into left leg. She is scheduled for left wrist surgery end of July. Takes oxycodone and ibuprofen for symptom control.   05/20/2024 - Patient presenting for follow up of back and leg pain. She primarily complains of pain in the left lower lumbar/buttock/hip region. She is managing her pain with oxycodone and ibuprofen as prescribed which is helpful. She is considering a LESI. No change to general medical health.   04/22/2024: Patient is here today for a follow up for back and leg pain. She states that she is currently experiencing a slight aggravation of her pain due to recent activity. She states that she experiences radiating pain when standing and leaning on the left side. She continues to perform at home exercises. Symptoms are consistent in intensity and location.   03/18/2024 - The patient reported experiencing both back and leg pain. She finds relief by taking ibuprofen daily and using oxycodone as prescribed at night. Although prior physical therapy was beneficial, it was discontinued due to a busy schedule; however, she continues to do the exercises at home. At this time, she is not interested in receiving interventional injections.  02/05/2024 - The patient is here for a follow-up, reporting constant low back pain along with pain radiating into the left leg. Despite these symptoms, she has returned to full-duty work and is effectively managing the pain. She takes ibuprofen and a muscle relaxer at night for relief. Additionally, she is actively participating in physical therapy, which has proven to be beneficial in managing her symptoms.  12/27/2023 - Patient presenting in follow up complaining of low back and left leg pain. She has been enrolled in physical therapy twice per week which is overall helpful. She has been out of work and feels ready to return. She is taking ibuprofen during the day and oxycodone at nighttime which is helpful for symptom control.   11/15/2023 - Patient presenting for MRI Review of L Spine. She primarily complains of left lower extremity pain/achiness worse with standing and weightbearing.   10/25/2023 - 43 y/o F presenting for initial evaluation of of low back pain, LLE numbness and weakness after a work-related injury on 9/12/23. 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 Landed onto her left side, wrist, and back. Symptom wise, since the accident she reports axial low back pain L > R. Also, complaints of intermittent numbness/tingling into the left leg. Denies any changes in LE strength. Wrist is being treated, she is wearing splint and received injections. She reports history of laminectomy a few years prior which was overall helpful. Weakness was never apparent in the past, and has been present since the injury. Treated with MDP and Tylenol without much relief. Pain becomes worse with extended periods of siting, standing, and walking. Received XRAYS which revealed no acute fractures. She is not working.    [de-identified] : pain mgmt-inj, chiropractic, hep

## 2025-03-22 NOTE — PHYSICAL EXAM
[Normal Coordination] : normal coordination [Normal DTR UE/LE] : normal DTR UE/LE  [Normal Sensation] : normal sensation [Normal Mood and Affect] : normal mood and affect [Oriented] : oriented [Able to Communicate] : able to communicate [Normal Skin] : normal skin [No Rash] : no rash [No Ulcers] : no ulcers [No Lesions] : no lesions [No obvious lymphadenopathy in areas examined] : no obvious lymphadenopathy in areas examined [Well Developed] : well developed [Peripheral vascular exam is grossly normal] : peripheral vascular exam is grossly normal [No Respiratory Distress] : no respiratory distress [Extension] : extension [4___] : left quadriceps 4[unfilled]/5 [3___] : left dorsiflexors 3[unfilled]/5 [5___] : right extensor hallicus longus 5[unfilled]/5 [de-identified] : Constitutional: - General Appearance: Unremarkable Body Habitus Well Developed Well Nourished Body Habitus No Deformities Well Groomed Ability To communicate: Normal Neurologic: Global sensation is intact to upper and lower extremities. See examination of Neck and/or Spine for exceptions. Orientation to Time, Place and Person is: Normal Mood And Affect is Normal Skin: - Head/Face, Right Upper/Lower Extremity, Left Upper/Lower Extremity: Normal See Examination of Neck and/or Spine for exceptions Cardiovascular: Peripheral Cardiovascular System is Normal Palpation of Lymph Nodes: Normal Palpation of lymph nodes in: Axilla, Cervical, Inguinal Abnormal Palpation of lymph nodes in: None  [] : non-antalgic [FreeTextEntry9] : Relief with Flexion

## 2025-03-22 NOTE — HISTORY OF PRESENT ILLNESS
[5] : 5 [Full time] : Work status: full time [] : yes [de-identified] : 03/21/2025: Patient presenting for W/C follow up. Patient is doing better. She is following up with Dr. Alvarez on 3/25/25 for Medial branch block. No calf or thigh pain since epidural, occasional buttock pain. Patient states her acupuncture was denied.   02/24/2025 - Patient returns to the office for a routine follow. She has recently followed up with interventional pain management and reports she has received authorization for lumbar medial branch blocks and facet ablation. She is eager to schedule as soon as availability permits. To continue to utilize oxycodone and anti-inflammatories.  01/27/2025 - Patient presenting for workers compensation follow up. She complains of persistent leg tingling in her left shin, as well as left buttock pain radiating throughout entire leg into ankle. She is scheduled for LESI tomorrow. Taking oxycodone as prescribed for pain management. Continues lumbar HEP, completed PT. Stopped chiropractic care due to increase in pain.   12/23/2024:  Patient presenting today for a follow up visit. She continues to experience lower back pain with symptoms radiating into the distal lower extremity. She is using the anti-inflammatory tablet regularly along with the oxycodone. She is scheduled an additional injection with Dr. Nguyen for end of January 2025. She has been continuing with chiropractic treatment but does not feel this is giving her any significant benefit lately.   11/22/2024 - Patient presenting in follow up workers comp. She complains of intermittent left leg pain. Pain is worse with extended sitting. Additional LESI at the L5S1 level was approved, and she is in the process of setting up another injection. Started going to chiropractic therapy recently and still evaluating her response. Her medication regimen remains the same.   10/25/2024 - The patient is presenting for a workers' compensation follow-up. She complains of back and leg pain, with the back pain being more severe. The pain worsens throughout the workday and with bending. She received her first injection, but the second injection was denied. She plans to start chiropractic therapy shortly. She wishes to proceed with the indicated injection as recommended by Dr. Alvarez. Taking ibuprofen and oxycodone for symptom control which are both helpful. Voltaregn gel is also helpful for pain control during the day.   09/18/2024 - Patient presenting in follow up. Symptoms remain the same. Since the LESI she reports no real relief. She is awaiting to hear if a different injection procedure has been approved which was indicated by Dr. Alvarez. She continues home stretching exercises. Uses heat and ice for pain control. Taking oxycodone and ibuprofen as prescribed for symptom control. She is working. She is in the process of finding chiropractor who takes workers comp.   08/19/2024 - Patient returns to the office for follow up. She has received lumbar steroid injection. It has been 10 days since the injection. She does not feel any benefit. She has an appointment to follow up with Dr. Alvarez tomorrow to discuss potential additional injections or alternative procedures. She continues to use oxycodone and anti-inflammatory medication's. She has completed left surgery. She has not yet been able to establish chiropractic care with a provider who accepts her insurance  07/22/2024 - Pt presents in follow up. She complains of increase in left sided radicular leg pain. She had consult with Dr. Alvarez and she plans to move forward with LESI. Also feels chiropractic therapy may be beneficial in improving symptoms. Leg pain most prevalent while sleeping. Denies right sided leg pain. scheduled for left wrist surgery this Friday - not taking nsaids as a result. Takes oxycodone or Tylenol for control of symptoms.   06/21/2024 - Patient presenting in follow up. Patient reports no real changes. She complains of increased low back pain radiating into left leg. She is scheduled for left wrist surgery end of July. Takes oxycodone and ibuprofen for symptom control.   05/20/2024 - Patient presenting for follow up of back and leg pain. She primarily complains of pain in the left lower lumbar/buttock/hip region. She is managing her pain with oxycodone and ibuprofen as prescribed which is helpful. She is considering a LESI. No change to general medical health.   04/22/2024: Patient is here today for a follow up for back and leg pain. She states that she is currently experiencing a slight aggravation of her pain due to recent activity. She states that she experiences radiating pain when standing and leaning on the left side. She continues to perform at home exercises. Symptoms are consistent in intensity and location.   03/18/2024 - The patient reported experiencing both back and leg pain. She finds relief by taking ibuprofen daily and using oxycodone as prescribed at night. Although prior physical therapy was beneficial, it was discontinued due to a busy schedule; however, she continues to do the exercises at home. At this time, she is not interested in receiving interventional injections.  02/05/2024 - The patient is here for a follow-up, reporting constant low back pain along with pain radiating into the left leg. Despite these symptoms, she has returned to full-duty work and is effectively managing the pain. She takes ibuprofen and a muscle relaxer at night for relief. Additionally, she is actively participating in physical therapy, which has proven to be beneficial in managing her symptoms.  12/27/2023 - Patient presenting in follow up complaining of low back and left leg pain. She has been enrolled in physical therapy twice per week which is overall helpful. She has been out of work and feels ready to return. She is taking ibuprofen during the day and oxycodone at nighttime which is helpful for symptom control.   11/15/2023 - Patient presenting for MRI Review of L Spine. She primarily complains of left lower extremity pain/achiness worse with standing and weightbearing.   10/25/2023 - 43 y/o F presenting for initial evaluation of of low back pain, LLE numbness and weakness after a work-related injury on 9/12/23. 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 Landed onto her left side, wrist, and back. Symptom wise, since the accident she reports axial low back pain L > R. Also, complaints of intermittent numbness/tingling into the left leg. Denies any changes in LE strength. Wrist is being treated, she is wearing splint and received injections. She reports history of laminectomy a few years prior which was overall helpful. Weakness was never apparent in the past, and has been present since the injury. Treated with MDP and Tylenol without much relief. Pain becomes worse with extended periods of siting, standing, and walking. Received XRAYS which revealed no acute fractures. She is not working.    [de-identified] : pain mgmt-inj, chiropractic, hep

## 2025-03-22 NOTE — DISCUSSION/SUMMARY
[Medication Risks Reviewed] : Medication risks reviewed [de-identified] : Medications were renewed today. Continue to follow up with Dr. Alvarez for the medial branch blocks and potential RFA depending on her response.  Patient was provided with a referral for lumbar physical therapy to work on stretching, strengthening and range of motion. Patient was provided with a lumbar home exercise program for 2x a week for 6 wks.  Referral for Dr. Gaitan today for acupuncture therapy 2x a week for 6 wks, again as I believe this will provide relief.    she will follow up in 6 weeks  Prior to appointment and during encounter with patient extensive medical records were reviewed including but not limited to, hospital records, outpatient records, imaging results, and lab data.During this appointment the patient was examined, diagnoses were discussed and explained in a face to face manner. In addition extensive time was spent reviewing aforementioned diagnostic studies. Counseling including abnormal image results, differential diagnoses, treatment options, risk and benefits, lifestyle changes, current condition, and current medications was performed. Patient's comments, questions, and concerns were addressed and patient verbalized understanding. Based on this patient's presentation at our office, which is an orthopedic spine surgeon's office, this patient inherently / intrinsically has a risk, however minute, of developing issues such as Cauda equina syndrome, bowel and bladder changes, or progression of motor or neurological deficits such as paralysis which may be permanent.   I, Clau Gregory, attest that this documentation has been prepared under the direction and in the presence of Provider Moisés Diggs MD.

## 2025-03-22 NOTE — HISTORY OF PRESENT ILLNESS
[5] : 5 [Full time] : Work status: full time [] : yes [de-identified] : 03/21/2025: Patient presenting for W/C follow up. Patient is doing better. She is following up with Dr. Alvarez on 3/25/25 for Medial branch block. No calf or thigh pain since epidural, occasional buttock pain. Patient states her acupuncture was denied.   02/24/2025 - Patient returns to the office for a routine follow. She has recently followed up with interventional pain management and reports she has received authorization for lumbar medial branch blocks and facet ablation. She is eager to schedule as soon as availability permits. To continue to utilize oxycodone and anti-inflammatories.  01/27/2025 - Patient presenting for workers compensation follow up. She complains of persistent leg tingling in her left shin, as well as left buttock pain radiating throughout entire leg into ankle. She is scheduled for LESI tomorrow. Taking oxycodone as prescribed for pain management. Continues lumbar HEP, completed PT. Stopped chiropractic care due to increase in pain.   12/23/2024:  Patient presenting today for a follow up visit. She continues to experience lower back pain with symptoms radiating into the distal lower extremity. She is using the anti-inflammatory tablet regularly along with the oxycodone. She is scheduled an additional injection with Dr. Nguyen for end of January 2025. She has been continuing with chiropractic treatment but does not feel this is giving her any significant benefit lately.   11/22/2024 - Patient presenting in follow up workers comp. She complains of intermittent left leg pain. Pain is worse with extended sitting. Additional LESI at the L5S1 level was approved, and she is in the process of setting up another injection. Started going to chiropractic therapy recently and still evaluating her response. Her medication regimen remains the same.   10/25/2024 - The patient is presenting for a workers' compensation follow-up. She complains of back and leg pain, with the back pain being more severe. The pain worsens throughout the workday and with bending. She received her first injection, but the second injection was denied. She plans to start chiropractic therapy shortly. She wishes to proceed with the indicated injection as recommended by Dr. Alvarez. Taking ibuprofen and oxycodone for symptom control which are both helpful. Voltaregn gel is also helpful for pain control during the day.   09/18/2024 - Patient presenting in follow up. Symptoms remain the same. Since the LESI she reports no real relief. She is awaiting to hear if a different injection procedure has been approved which was indicated by Dr. Alvarez. She continues home stretching exercises. Uses heat and ice for pain control. Taking oxycodone and ibuprofen as prescribed for symptom control. She is working. She is in the process of finding chiropractor who takes workers comp.   08/19/2024 - Patient returns to the office for follow up. She has received lumbar steroid injection. It has been 10 days since the injection. She does not feel any benefit. She has an appointment to follow up with Dr. Alvarez tomorrow to discuss potential additional injections or alternative procedures. She continues to use oxycodone and anti-inflammatory medication's. She has completed left surgery. She has not yet been able to establish chiropractic care with a provider who accepts her insurance  07/22/2024 - Pt presents in follow up. She complains of increase in left sided radicular leg pain. She had consult with Dr. Alvarez and she plans to move forward with LESI. Also feels chiropractic therapy may be beneficial in improving symptoms. Leg pain most prevalent while sleeping. Denies right sided leg pain. scheduled for left wrist surgery this Friday - not taking nsaids as a result. Takes oxycodone or Tylenol for control of symptoms.   06/21/2024 - Patient presenting in follow up. Patient reports no real changes. She complains of increased low back pain radiating into left leg. She is scheduled for left wrist surgery end of July. Takes oxycodone and ibuprofen for symptom control.   05/20/2024 - Patient presenting for follow up of back and leg pain. She primarily complains of pain in the left lower lumbar/buttock/hip region. She is managing her pain with oxycodone and ibuprofen as prescribed which is helpful. She is considering a LESI. No change to general medical health.   04/22/2024: Patient is here today for a follow up for back and leg pain. She states that she is currently experiencing a slight aggravation of her pain due to recent activity. She states that she experiences radiating pain when standing and leaning on the left side. She continues to perform at home exercises. Symptoms are consistent in intensity and location.   03/18/2024 - The patient reported experiencing both back and leg pain. She finds relief by taking ibuprofen daily and using oxycodone as prescribed at night. Although prior physical therapy was beneficial, it was discontinued due to a busy schedule; however, she continues to do the exercises at home. At this time, she is not interested in receiving interventional injections.  02/05/2024 - The patient is here for a follow-up, reporting constant low back pain along with pain radiating into the left leg. Despite these symptoms, she has returned to full-duty work and is effectively managing the pain. She takes ibuprofen and a muscle relaxer at night for relief. Additionally, she is actively participating in physical therapy, which has proven to be beneficial in managing her symptoms.  12/27/2023 - Patient presenting in follow up complaining of low back and left leg pain. She has been enrolled in physical therapy twice per week which is overall helpful. She has been out of work and feels ready to return. She is taking ibuprofen during the day and oxycodone at nighttime which is helpful for symptom control.   11/15/2023 - Patient presenting for MRI Review of L Spine. She primarily complains of left lower extremity pain/achiness worse with standing and weightbearing.   10/25/2023 - 43 y/o F presenting for initial evaluation of of low back pain, LLE numbness and weakness after a work-related injury on 9/12/23. 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 Landed onto her left side, wrist, and back. Symptom wise, since the accident she reports axial low back pain L > R. Also, complaints of intermittent numbness/tingling into the left leg. Denies any changes in LE strength. Wrist is being treated, she is wearing splint and received injections. She reports history of laminectomy a few years prior which was overall helpful. Weakness was never apparent in the past, and has been present since the injury. Treated with MDP and Tylenol without much relief. Pain becomes worse with extended periods of siting, standing, and walking. Received XRAYS which revealed no acute fractures. She is not working.    [de-identified] : pain mgmt-inj, chiropractic, hep

## 2025-03-22 NOTE — PHYSICAL EXAM
[Normal Coordination] : normal coordination [Normal DTR UE/LE] : normal DTR UE/LE  [Normal Sensation] : normal sensation [Normal Mood and Affect] : normal mood and affect [Oriented] : oriented [Able to Communicate] : able to communicate [Normal Skin] : normal skin [No Rash] : no rash [No Ulcers] : no ulcers [No Lesions] : no lesions [No obvious lymphadenopathy in areas examined] : no obvious lymphadenopathy in areas examined [Well Developed] : well developed [Peripheral vascular exam is grossly normal] : peripheral vascular exam is grossly normal [No Respiratory Distress] : no respiratory distress [Extension] : extension [4___] : left quadriceps 4[unfilled]/5 [3___] : left dorsiflexors 3[unfilled]/5 [5___] : right extensor hallicus longus 5[unfilled]/5 [de-identified] : Constitutional: - General Appearance: Unremarkable Body Habitus Well Developed Well Nourished Body Habitus No Deformities Well Groomed Ability To communicate: Normal Neurologic: Global sensation is intact to upper and lower extremities. See examination of Neck and/or Spine for exceptions. Orientation to Time, Place and Person is: Normal Mood And Affect is Normal Skin: - Head/Face, Right Upper/Lower Extremity, Left Upper/Lower Extremity: Normal See Examination of Neck and/or Spine for exceptions Cardiovascular: Peripheral Cardiovascular System is Normal Palpation of Lymph Nodes: Normal Palpation of lymph nodes in: Axilla, Cervical, Inguinal Abnormal Palpation of lymph nodes in: None  [] : non-antalgic [FreeTextEntry9] : Relief with Flexion

## 2025-03-22 NOTE — DISCUSSION/SUMMARY
[Medication Risks Reviewed] : Medication risks reviewed [de-identified] : Medications were renewed today. Continue to follow up with Dr. Alvarez for the medial branch blocks and potential RFA depending on her response.  Patient was provided with a referral for lumbar physical therapy to work on stretching, strengthening and range of motion. Patient was provided with a lumbar home exercise program for 2x a week for 6 wks.  Referral for Dr. Gaitan today for acupuncture therapy 2x a week for 6 wks, again as I believe this will provide relief.    she will follow up in 6 weeks  Prior to appointment and during encounter with patient extensive medical records were reviewed including but not limited to, hospital records, outpatient records, imaging results, and lab data.During this appointment the patient was examined, diagnoses were discussed and explained in a face to face manner. In addition extensive time was spent reviewing aforementioned diagnostic studies. Counseling including abnormal image results, differential diagnoses, treatment options, risk and benefits, lifestyle changes, current condition, and current medications was performed. Patient's comments, questions, and concerns were addressed and patient verbalized understanding. Based on this patient's presentation at our office, which is an orthopedic spine surgeon's office, this patient inherently / intrinsically has a risk, however minute, of developing issues such as Cauda equina syndrome, bowel and bladder changes, or progression of motor or neurological deficits such as paralysis which may be permanent.   I, Clau Gregory, attest that this documentation has been prepared under the direction and in the presence of Provider Moisés Diggs MD.

## 2025-04-18 NOTE — ASSESSMENT
[FreeTextEntry1] : A discussion regarding available pain management treatment options occurred with the patient.  These included interventional, rehabilitative, pharmacological, and alternative modalities. We will proceed with the following:    Interventional treatment options:  - Proceed with repeat bilateral L4-L5, L5-S1 facet joint MBB with fluoroscopic guidance - Proceed to RFA if adequate relief with diagnostic intervention  - Can consider repeat left L5-S1, S1 TFESI (80 mg Kenalog) with return of severe left radicular pain - see additional instructions below    Rehabilitative options:   - Continue physical therapy for low back - participation in active HEP was discussed and encouraged as tolerated - Home exercise sheet provided at prior visit  Medication based treatment options:   - Continue ibuprofen 400-600 mg up to TID as needed - Patient on chronic opioid therapy; once again discussed risks of habituation, tolerance, and dependence - see additional instructions below    Complementary treatment options:   - Weight management and lifestyle modifications discussed - Patient failed chiropractic care - Pending approval for acupuncture from [employer]  Additional treatment recommendations as follows:   - patient will follow-up with Dr. Diggs as directed - Follow up 1-2 weeks post injection for assessment of efficacy and further treatment recommendations  The risks, benefits and alternatives of the proposed procedure were explained in detail with the patient. The risks outlined include but are not limited to infection, bleeding, post- dural puncture headache, nerve injury, a temporary increase in pain, failure to resolve symptoms, need for future interventions, allergic reaction, and possible elevation of blood sugar in diabetics if using corticosteroid.  All questions were answered to patient's apparent satisfaction, and he/she verbalized an understanding.  Patient presents with axial lumbar pain that has not responded to 3 months of conservative therapy including physical therapy or NSAID therapy.  The pain is interfering with activities of daily living and functionality.  There is no radicular pain.  The pain is exacerbated by facet loading.  Positive Kemps maneuver which is defined by pain reproduction with extension and rotation of the lumbar spine to the affected side.  The patient has not had a vertebral fusion at the levels of the proposed treatment.  There is no unexplained neurologic deficit.  There is no history of systemic infection, unstable medical condition, bleeding tendency, or local infection.  The injection is being performed to diagnose the facet joint as the source of the individual's pain, in preparation for a radiofrequency ablation.  We have discussed the risks, benefits, and alternatives for NSAID therapy including but not limited to the risk of bleeding, thrombosis, gastric mucosal irritation/ulceration, allergic reaction and kidney dysfunction.  The patient verbalizes an understanding.

## 2025-04-18 NOTE — DATA REVIEWED
[MRI] : MRI [Lumbar Spine] : lumbar spine [Report was reviewed and noted in the chart] : The report was reviewed and noted in the chart [I reviewed the films/CD] : I reviewed the films/CD Strong peripheral pulses

## 2025-04-18 NOTE — HISTORY OF PRESENT ILLNESS
[Lower back] : lower back [Work related] : work related [6] : 6 [5] : 5 [Burning] : burning [Dull/Aching] : dull/aching [Sharp] : sharp [Stabbing] : stabbing [Constant] : constant [Household chores] : household chores [Leisure] : leisure [Work] : work [Sleep] : sleep [Meds] : meds [Injection therapy] : injection therapy [Sitting] : sitting [Standing] : standing [Walking] : walking [Bending forward] : bending forward [Exercising] : exercising [Full time] : Work status: full time [FreeTextEntry1] : 2025 - Patient presents for FUV after a bilateral L4-5, L5-S1 facet joint MBB on 3/25/2025. Patient reports >80% reduction of axial low back pain the day of the procedure and then a gradual return to baseline pain the day after.  Patient reports an improvement in functionality, ability to perform household chores with a significant decrease in pain, and the ability to lay on her stomach which she has not been able to do without pain previously.  Patient had a positive response to the procedure and wishes to proceed with a repeat MBB at this time.  Denies alarm signs or changes in medical history since last visit. Pending approval from W/C to begin acupuncture.   2025 - Patient presents for FUV after a left L5-S1, S1 TFESI on 2025. Patient reports 80% reduction of pain overall. Her leg pain is mostly resolved. She continues to have some residual pain and "stiffness" in her low back (L>R). Mornings and sit-to-stand are difficult.  She remains on ibuprofen and oxycodone as needed basis.  Continues to work full-time.  2024 - Patient presents for 3-month W/C FUV regarding their low back pain. Patient reports although previously reported minimal relief with DANITZA on 24, she now notes that she did in fact receive >50% reduction in pain for at least 8 weeks. 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 She found significant improvement in her functionality and quality of life. Her pain gradually returned to baseline and is now impacting her ADLs and functionality.  She presents today to discuss next steps in her treatment plan with the hope of decreasing her pain and increasing her functionality and quality of life.   2024 - Patient presents for FUV after a Left L5-S1 TFESI on 24.  She reports minimal relief after the procedure.  Still continues to complain of low back pain with radiation to the left lower extremity extending to the calf.  Patient remains on ibuprofen and oxycodone as needed.  The patient presents for initial W/C evaluation regarding their low back pain. Patient was referred by Dr. Diggs. 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 Patient had a L5-S1 laminectomy/discectomy about 5 years ago which had resolver her radicular leg pain at the time.  After the work injury her pain is across the lower back with radiation down the left lower extremity; this is markedly worse than prior.  Of note, patient is scheduled for left wrist surgery in the upcoming weeks.  Patient had missed several months following her injury have returned to a full duty capacity in 2024.   DOI: 2023 Occupation: Teacher Working status: Full duty  Injections: 1) Left L5-S1 TFESI (2024) 2) Left L5-S1, S1 TFESI (2025) 3) Bilateral L4-5, L5-S1 facet joint MBB (3/25/2025)  Pertinent Surgical History: 1) L5-S1 Discectomy (~5 years ago) - Dr. Trejo  Imagin) MRI Lumbar Spine (2023) - [facility]  At L1-2: No significant spinal canal or neural foraminal stenosis. At L2-3: No significant spinal canal or neural foraminal stenosis. At L3-4: No significant spinal canal or neural foraminal stenosis. At L4-5: No significant spinal canal or neural foraminal stenosis. At L5-S1: Bilateral hemilaminectomy changes are noted. There is grade 1 anterolisthesis of L5 on S1 with uncovering of the intervertebral disc. There is a superimposed disc bulge with bilateral facet arthrosis resulting in severe left with impingement of the exiting L5 nerve root and mild right-sided neural foraminal narrowing.    Physician Disclaimer: I have personally reviewed and confirmed all HPI data with the patient.  [] : Post Surgical Visit: no [FreeTextEntry3] : 9/12/2023 [de-identified] : L MRI AT

## 2025-04-18 NOTE — PHYSICAL EXAM
[de-identified] : Constitutional:   - No acute distress   - Well developed; well nourished    Neurological:   - normal mood and affect   - alert and oriented x 3     Cardiovascular:   - grossly normal   Lumbar Spine Exam:   Inspection: erythema (-) ecchymosis (-) rashes (-) alignment: no scoliosis Well healed surgical scar midline  Palpation: Midline lumbar tenderness:            (-) midline thoracic tenderness:          (-) Lumbar paraspinal tenderness:  L (+) ; R (-) thoracic paraspinal tenderness: L (-) ; R (-) sciatic nerve tenderness:           L (+) ; R (-) SI joint tenderness:                     L (-) ; R (-) GTB tenderness:                        L (-);  R (-)   ROM: WNL; stiffness throughout ROM testing pain with extremes of flexion/extension   Strength:                                    Right       Left    Hip Flexion:                (5/5)       (5/5) Quadriceps:               (5/5)       (5/5) Hamstrings:                (5/5)       (5/5) Ankle Dorsiflexion:     (5/5)       (4/5) EHL:                           (5/5)       (4/5) Ankle Plantarflexion:  (5/5)       (5/5)   Special Tests: SLR:                            R (-) ; L (=) Facet loading:             R (+) ; L (+) LORENA test:                R (N/A) ; L (N/A) Hamstring tightness:   R (-);  L (-)   Neurologic: SILT throughout right lower extremity SILT throughout left lower extremity with exception of left L5 distribution   Reflexes normal and symmetric bilateral lower extremities   Gait: non- antalgic gait ambulates without assistive device

## 2025-05-01 NOTE — DISCUSSION/SUMMARY
[Medication Risks Reviewed] : Medication risks reviewed [de-identified] : Patient experienced significant transient relief in low back pain following first L4/5 L5S1 MBB on March 25th. She will continue to follow up with Dr. Alvarez for second MBB and potential RFA depending on her response to second MBB. Continue to await authorization for PT. Patient was provided with a referral for lumbar physical therapy to work on stretching, strengthening and range of motion. Acupuncture therapy 2x a week for 6 wks, again as I believe this will provide relief. Discussed continued medical mgmt with ibuprofen and oxycodone. Renewed medication today. Medication management and risks reviewed.  She will follow up in 6 weeks  Prior to appointment and during encounter with patient extensive medical records were reviewed including but not limited to, hospital records, outpatient records, imaging results, and lab data.During this appointment the patient was examined, diagnoses were discussed and explained in a face to face manner. In addition extensive time was spent reviewing aforementioned diagnostic studies. Counseling including abnormal image results, differential diagnoses, treatment options, risk and benefits, lifestyle changes, current condition, and current medications was performed. Patient's comments, questions, and concerns were addressed and patient verbalized understanding. Based on this patient's presentation at our office, which is an orthopedic spine surgeon's office, this patient inherently / intrinsically has a risk, however minute, of developing issues such as Cauda equina syndrome, bowel and bladder changes, or progression of motor or neurological deficits such as paralysis which may be permanent.  MEGHANA SILVESTRE Acting as a Scribe for Meghana Malik, attest that this documentation has been prepared under the direction and in the presence of Provider Moisés Diggs MD.

## 2025-05-01 NOTE — DISCUSSION/SUMMARY
[Medication Risks Reviewed] : Medication risks reviewed [de-identified] : Patient experienced significant transient relief in low back pain following first L4/5 L5S1 MBB on March 25th. She will continue to follow up with Dr. Alvarez for second MBB and potential RFA depending on her response to second MBB. Continue to await authorization for PT. Patient was provided with a referral for lumbar physical therapy to work on stretching, strengthening and range of motion. Acupuncture therapy 2x a week for 6 wks, again as I believe this will provide relief. Discussed continued medical mgmt with ibuprofen and oxycodone. Renewed medication today. Medication management and risks reviewed.  She will follow up in 6 weeks  Prior to appointment and during encounter with patient extensive medical records were reviewed including but not limited to, hospital records, outpatient records, imaging results, and lab data.During this appointment the patient was examined, diagnoses were discussed and explained in a face to face manner. In addition extensive time was spent reviewing aforementioned diagnostic studies. Counseling including abnormal image results, differential diagnoses, treatment options, risk and benefits, lifestyle changes, current condition, and current medications was performed. Patient's comments, questions, and concerns were addressed and patient verbalized understanding. Based on this patient's presentation at our office, which is an orthopedic spine surgeon's office, this patient inherently / intrinsically has a risk, however minute, of developing issues such as Cauda equina syndrome, bowel and bladder changes, or progression of motor or neurological deficits such as paralysis which may be permanent.  MEGHANA SILVESTRE Acting as a Scribe for Meghana Malik, attest that this documentation has been prepared under the direction and in the presence of Provider Moisés Diggs MD.

## 2025-05-01 NOTE — PHYSICAL EXAM
[Normal Coordination] : normal coordination [Normal DTR UE/LE] : normal DTR UE/LE  [Normal Sensation] : normal sensation [Normal Mood and Affect] : normal mood and affect [Oriented] : oriented [Able to Communicate] : able to communicate [Normal Skin] : normal skin [No Rash] : no rash [No Ulcers] : no ulcers [No Lesions] : no lesions [No obvious lymphadenopathy in areas examined] : no obvious lymphadenopathy in areas examined [Well Developed] : well developed [Peripheral vascular exam is grossly normal] : peripheral vascular exam is grossly normal [No Respiratory Distress] : no respiratory distress [Extension] : extension [4___] : left quadriceps 4[unfilled]/5 [3___] : left dorsiflexors 3[unfilled]/5 [5___] : right extensor hallicus longus 5[unfilled]/5 [de-identified] : Constitutional: - General Appearance: Unremarkable Body Habitus Well Developed Well Nourished Body Habitus No Deformities Well Groomed Ability To communicate: Normal Neurologic: Global sensation is intact to upper and lower extremities. See examination of Neck and/or Spine for exceptions. Orientation to Time, Place and Person is: Normal Mood And Affect is Normal Skin: - Head/Face, Right Upper/Lower Extremity, Left Upper/Lower Extremity: Normal See Examination of Neck and/or Spine for exceptions Cardiovascular: Peripheral Cardiovascular System is Normal Palpation of Lymph Nodes: Normal Palpation of lymph nodes in: Axilla, Cervical, Inguinal Abnormal Palpation of lymph nodes in: None  [] : non-antalgic [FreeTextEntry9] : Relief with Flexion

## 2025-05-01 NOTE — PHYSICAL EXAM
[Normal Coordination] : normal coordination [Normal DTR UE/LE] : normal DTR UE/LE  [Normal Sensation] : normal sensation [Normal Mood and Affect] : normal mood and affect [Oriented] : oriented [Able to Communicate] : able to communicate [Normal Skin] : normal skin [No Rash] : no rash [No Ulcers] : no ulcers [No Lesions] : no lesions [No obvious lymphadenopathy in areas examined] : no obvious lymphadenopathy in areas examined [Well Developed] : well developed [Peripheral vascular exam is grossly normal] : peripheral vascular exam is grossly normal [No Respiratory Distress] : no respiratory distress [Extension] : extension [4___] : left quadriceps 4[unfilled]/5 [3___] : left dorsiflexors 3[unfilled]/5 [5___] : right extensor hallicus longus 5[unfilled]/5 [de-identified] : Constitutional: - General Appearance: Unremarkable Body Habitus Well Developed Well Nourished Body Habitus No Deformities Well Groomed Ability To communicate: Normal Neurologic: Global sensation is intact to upper and lower extremities. See examination of Neck and/or Spine for exceptions. Orientation to Time, Place and Person is: Normal Mood And Affect is Normal Skin: - Head/Face, Right Upper/Lower Extremity, Left Upper/Lower Extremity: Normal See Examination of Neck and/or Spine for exceptions Cardiovascular: Peripheral Cardiovascular System is Normal Palpation of Lymph Nodes: Normal Palpation of lymph nodes in: Axilla, Cervical, Inguinal Abnormal Palpation of lymph nodes in: None  [] : non-antalgic [FreeTextEntry9] : Relief with Flexion

## 2025-05-01 NOTE — HISTORY OF PRESENT ILLNESS
[5] : 5 [Full time] : Work status: full time [] : yes [de-identified] : 04/28/2025 - Patient returns for workers compensation follow up. Physical therapy continues to be denied. She has continued with home exercises in hopes to manage symptoms. Her last PT trial was last February. She had MBB L4/5 L5S1 on March 25th with Dr. Alvarez and noted significant transient relief in normal low back pain. Continues to utilize oxycodone and anti-inflammatories.  03/21/2025: Patient presenting for W/C follow up. Patient is doing better. She is following up with Dr. Alvarez on 3/25/25 for Medial branch block. No calf or thigh pain since epidural, occasional buttock pain. Patient states her acupuncture was denied.   02/24/2025 - Patient returns to the office for a routine follow. She has recently followed up with interventional pain management and reports she has received authorization for lumbar medial branch blocks and facet ablation. She is eager to schedule as soon as availability permits. To continue to utilize oxycodone and anti-inflammatories.  01/27/2025 - Patient presenting for workers compensation follow up. She complains of persistent leg tingling in her left shin, as well as left buttock pain radiating throughout entire leg into ankle. She is scheduled for LESI tomorrow. Taking oxycodone as prescribed for pain management. Continues lumbar HEP, completed PT. Stopped chiropractic care due to increase in pain.   12/23/2024:  Patient presenting today for a follow up visit. She continues to experience lower back pain with symptoms radiating into the distal lower extremity. She is using the anti-inflammatory tablet regularly along with the oxycodone. She is scheduled an additional injection with Dr. Nguyen for end of January 2025. She has been continuing with chiropractic treatment but does not feel this is giving her any significant benefit lately.   11/22/2024 - Patient presenting in follow up workers comp. She complains of intermittent left leg pain. Pain is worse with extended sitting. Additional LESI at the L5S1 level was approved, and she is in the process of setting up another injection. Started going to chiropractic therapy recently and still evaluating her response. Her medication regimen remains the same.   10/25/2024 - The patient is presenting for a workers' compensation follow-up. She complains of back and leg pain, with the back pain being more severe. The pain worsens throughout the workday and with bending. She received her first injection, but the second injection was denied. She plans to start chiropractic therapy shortly. She wishes to proceed with the indicated injection as recommended by Dr. Alvarez. Taking ibuprofen and oxycodone for symptom control which are both helpful. Voltaregn gel is also helpful for pain control during the day.   09/18/2024 - Patient presenting in follow up. Symptoms remain the same. Since the LESI she reports no real relief. She is awaiting to hear if a different injection procedure has been approved which was indicated by Dr. Alvarez. She continues home stretching exercises. Uses heat and ice for pain control. Taking oxycodone and ibuprofen as prescribed for symptom control. She is working. She is in the process of finding chiropractor who takes workers comp.   08/19/2024 - Patient returns to the office for follow up. She has received lumbar steroid injection. It has been 10 days since the injection. She does not feel any benefit. She has an appointment to follow up with Dr. Alvarez tomorrow to discuss potential additional injections or alternative procedures. She continues to use oxycodone and anti-inflammatory medication's. She has completed left surgery. She has not yet been able to establish chiropractic care with a provider who accepts her insurance  07/22/2024 - Pt presents in follow up. She complains of increase in left sided radicular leg pain. She had consult with Dr. Alvarez and she plans to move forward with LESI. Also feels chiropractic therapy may be beneficial in improving symptoms. Leg pain most prevalent while sleeping. Denies right sided leg pain. scheduled for left wrist surgery this Friday - not taking nsaids as a result. Takes oxycodone or Tylenol for control of symptoms.   06/21/2024 - Patient presenting in follow up. Patient reports no real changes. She complains of increased low back pain radiating into left leg. She is scheduled for left wrist surgery end of July. Takes oxycodone and ibuprofen for symptom control.   05/20/2024 - Patient presenting for follow up of back and leg pain. She primarily complains of pain in the left lower lumbar/buttock/hip region. She is managing her pain with oxycodone and ibuprofen as prescribed which is helpful. She is considering a LESI. No change to general medical health.   04/22/2024: Patient is here today for a follow up for back and leg pain. She states that she is currently experiencing a slight aggravation of her pain due to recent activity. She states that she experiences radiating pain when standing and leaning on the left side. She continues to perform at home exercises. Symptoms are consistent in intensity and location.   03/18/2024 - The patient reported experiencing both back and leg pain. She finds relief by taking ibuprofen daily and using oxycodone as prescribed at night. Although prior physical therapy was beneficial, it was discontinued due to a busy schedule; however, she continues to do the exercises at home. At this time, she is not interested in receiving interventional injections.  02/05/2024 - The patient is here for a follow-up, reporting constant low back pain along with pain radiating into the left leg. Despite these symptoms, she has returned to full-duty work and is effectively managing the pain. She takes ibuprofen and a muscle relaxer at night for relief. Additionally, she is actively participating in physical therapy, which has proven to be beneficial in managing her symptoms.  12/27/2023 - Patient presenting in follow up complaining of low back and left leg pain. She has been enrolled in physical therapy twice per week which is overall helpful. She has been out of work and feels ready to return. She is taking ibuprofen during the day and oxycodone at nighttime which is helpful for symptom control.   11/15/2023 - Patient presenting for MRI Review of L Spine. She primarily complains of left lower extremity pain/achiness worse with standing and weightbearing.   10/25/2023 - 45 y/o F presenting for initial evaluation of of low back pain, LLE numbness and weakness after a work-related injury on 9/12/23. 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 Landed onto her left side, wrist, and back. Symptom wise, since the accident she reports axial low back pain L > R. Also, complaints of intermittent numbness/tingling into the left leg. Denies any changes in LE strength. Wrist is being treated, she is wearing splint and received injections. She reports history of laminectomy a few years prior which was overall helpful. Weakness was never apparent in the past, and has been present since the injury. Treated with MDP and Tylenol without much relief. Pain becomes worse with extended periods of siting, standing, and walking. Received XRAYS which revealed no acute fractures. She is not working.    [de-identified] : pain mgmt- inj

## 2025-05-01 NOTE — HISTORY OF PRESENT ILLNESS
[5] : 5 [Full time] : Work status: full time [] : yes [de-identified] : 04/28/2025 - Patient returns for workers compensation follow up. Physical therapy continues to be denied. She has continued with home exercises in hopes to manage symptoms. Her last PT trial was last February. She had MBB L4/5 L5S1 on March 25th with Dr. Alvarez and noted significant transient relief in normal low back pain. Continues to utilize oxycodone and anti-inflammatories.  03/21/2025: Patient presenting for W/C follow up. Patient is doing better. She is following up with Dr. Alvarez on 3/25/25 for Medial branch block. No calf or thigh pain since epidural, occasional buttock pain. Patient states her acupuncture was denied.   02/24/2025 - Patient returns to the office for a routine follow. She has recently followed up with interventional pain management and reports she has received authorization for lumbar medial branch blocks and facet ablation. She is eager to schedule as soon as availability permits. To continue to utilize oxycodone and anti-inflammatories.  01/27/2025 - Patient presenting for workers compensation follow up. She complains of persistent leg tingling in her left shin, as well as left buttock pain radiating throughout entire leg into ankle. She is scheduled for LESI tomorrow. Taking oxycodone as prescribed for pain management. Continues lumbar HEP, completed PT. Stopped chiropractic care due to increase in pain.   12/23/2024:  Patient presenting today for a follow up visit. She continues to experience lower back pain with symptoms radiating into the distal lower extremity. She is using the anti-inflammatory tablet regularly along with the oxycodone. She is scheduled an additional injection with Dr. Nguyen for end of January 2025. She has been continuing with chiropractic treatment but does not feel this is giving her any significant benefit lately.   11/22/2024 - Patient presenting in follow up workers comp. She complains of intermittent left leg pain. Pain is worse with extended sitting. Additional LESI at the L5S1 level was approved, and she is in the process of setting up another injection. Started going to chiropractic therapy recently and still evaluating her response. Her medication regimen remains the same.   10/25/2024 - The patient is presenting for a workers' compensation follow-up. She complains of back and leg pain, with the back pain being more severe. The pain worsens throughout the workday and with bending. She received her first injection, but the second injection was denied. She plans to start chiropractic therapy shortly. She wishes to proceed with the indicated injection as recommended by Dr. Alvarez. Taking ibuprofen and oxycodone for symptom control which are both helpful. Voltaregn gel is also helpful for pain control during the day.   09/18/2024 - Patient presenting in follow up. Symptoms remain the same. Since the LESI she reports no real relief. She is awaiting to hear if a different injection procedure has been approved which was indicated by Dr. Alvarez. She continues home stretching exercises. Uses heat and ice for pain control. Taking oxycodone and ibuprofen as prescribed for symptom control. She is working. She is in the process of finding chiropractor who takes workers comp.   08/19/2024 - Patient returns to the office for follow up. She has received lumbar steroid injection. It has been 10 days since the injection. She does not feel any benefit. She has an appointment to follow up with Dr. Alvarez tomorrow to discuss potential additional injections or alternative procedures. She continues to use oxycodone and anti-inflammatory medication's. She has completed left surgery. She has not yet been able to establish chiropractic care with a provider who accepts her insurance  07/22/2024 - Pt presents in follow up. She complains of increase in left sided radicular leg pain. She had consult with Dr. Alvarez and she plans to move forward with LESI. Also feels chiropractic therapy may be beneficial in improving symptoms. Leg pain most prevalent while sleeping. Denies right sided leg pain. scheduled for left wrist surgery this Friday - not taking nsaids as a result. Takes oxycodone or Tylenol for control of symptoms.   06/21/2024 - Patient presenting in follow up. Patient reports no real changes. She complains of increased low back pain radiating into left leg. She is scheduled for left wrist surgery end of July. Takes oxycodone and ibuprofen for symptom control.   05/20/2024 - Patient presenting for follow up of back and leg pain. She primarily complains of pain in the left lower lumbar/buttock/hip region. She is managing her pain with oxycodone and ibuprofen as prescribed which is helpful. She is considering a LESI. No change to general medical health.   04/22/2024: Patient is here today for a follow up for back and leg pain. She states that she is currently experiencing a slight aggravation of her pain due to recent activity. She states that she experiences radiating pain when standing and leaning on the left side. She continues to perform at home exercises. Symptoms are consistent in intensity and location.   03/18/2024 - The patient reported experiencing both back and leg pain. She finds relief by taking ibuprofen daily and using oxycodone as prescribed at night. Although prior physical therapy was beneficial, it was discontinued due to a busy schedule; however, she continues to do the exercises at home. At this time, she is not interested in receiving interventional injections.  02/05/2024 - The patient is here for a follow-up, reporting constant low back pain along with pain radiating into the left leg. Despite these symptoms, she has returned to full-duty work and is effectively managing the pain. She takes ibuprofen and a muscle relaxer at night for relief. Additionally, she is actively participating in physical therapy, which has proven to be beneficial in managing her symptoms.  12/27/2023 - Patient presenting in follow up complaining of low back and left leg pain. She has been enrolled in physical therapy twice per week which is overall helpful. She has been out of work and feels ready to return. She is taking ibuprofen during the day and oxycodone at nighttime which is helpful for symptom control.   11/15/2023 - Patient presenting for MRI Review of L Spine. She primarily complains of left lower extremity pain/achiness worse with standing and weightbearing.   10/25/2023 - 45 y/o F presenting for initial evaluation of of low back pain, LLE numbness and weakness after a work-related injury on 9/12/23. 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 Landed onto her left side, wrist, and back. Symptom wise, since the accident she reports axial low back pain L > R. Also, complaints of intermittent numbness/tingling into the left leg. Denies any changes in LE strength. Wrist is being treated, she is wearing splint and received injections. She reports history of laminectomy a few years prior which was overall helpful. Weakness was never apparent in the past, and has been present since the injury. Treated with MDP and Tylenol without much relief. Pain becomes worse with extended periods of siting, standing, and walking. Received XRAYS which revealed no acute fractures. She is not working.    [de-identified] : pain mgmt- inj

## 2025-05-27 NOTE — PHYSICAL EXAM
[de-identified] : Constitutional:   - No acute distress   - Well developed; well nourished    Neurological:   - normal mood and affect   - alert and oriented x 3     Cardiovascular:   - grossly normal   Lumbar Spine Exam:   Inspection: erythema (-) ecchymosis (-) rashes (-) alignment: no scoliosis Well healed surgical scar midline  Palpation: Midline lumbar tenderness:            (-) midline thoracic tenderness:          (-) Lumbar paraspinal tenderness:  L (+) ; R (-) thoracic paraspinal tenderness: L (-) ; R (-) sciatic nerve tenderness:           L (+) ; R (-) SI joint tenderness:                     L (-) ; R (-) GTB tenderness:                        L (-);  R (-)   ROM: WNL; stiffness throughout ROM testing pain with extremes of flexion/extension   Strength:                                    Right       Left    Hip Flexion:                (5/5)       (5/5) Quadriceps:               (5/5)       (5/5) Hamstrings:                (5/5)       (5/5) Ankle Dorsiflexion:     (5/5)       (4/5) EHL:                           (5/5)       (4/5) Ankle Plantarflexion:  (5/5)       (5/5)   Special Tests: SLR:                            R (-) ; L (=) Facet loading:             R (+) ; L (+) LORENA test:                R (N/A) ; L (N/A) Hamstring tightness:   R (-);  L (-)   Neurologic: SILT throughout right lower extremity SILT throughout left lower extremity with exception of left L5 distribution   Reflexes normal and symmetric bilateral lower extremities   Gait: non- antalgic gait ambulates without assistive device

## 2025-05-27 NOTE — HISTORY OF PRESENT ILLNESS
[Lower back] : lower back [8] : 8 [5] : 5 [Dull/Aching] : dull/aching [Radiating] : radiating [Sharp] : sharp [Shooting] : shooting [Stabbing] : stabbing [Tingling] : tingling [Constant] : constant [Household chores] : household chores [Leisure] : leisure [Sleep] : sleep [Ice] : ice [Heat] : heat [Injection therapy] : injection therapy [Full time] : Work status: full time [FreeTextEntry1] : 2025 - Patient presents for FUV after a bilateral L4-5, L5-S1 facet joint MBB on 2025. Patient reports >90% relief of axial low back pain the day of the procedure and a gradual return to baseline pain the following day. Patient reports an improvement in the ability to perform household chores without limitations and sleeping without interruption. Patient had a positive response with the repeat nerve block and wishes to proceed to the facet joint RFA at this time. Denies any alarm signs or changes in medical history since last office visit. Continues ibuprofen and Oxycodone for pain management on an as needed basis.  Continues to work full time.   2025 - Patient presents for FUV after a bilateral L4-5, L5-S1 facet joint MBB on 3/25/2025. Patient reports >80% reduction of axial low back pain the day of the procedure and then a gradual return to baseline pain the day after.  Patient reports an improvement in functionality, ability to perform household chores with a significant decrease in pain, and the ability to lay on her stomach which she has not been able to do without pain previously.  Patient had a positive response to the procedure and wishes to proceed with a repeat MBB at this time.  Denies alarm signs or changes in medical history since last visit. Pending approval from W/C to begin acupuncture.   2025 - Patient presents for FUV after a left L5-S1, S1 TFESI on 2025. Patient reports 80% reduction of pain overall. Her leg pain is mostly resolved. She continues to have some residual pain and "stiffness" in her low back (L>R). Mornings and sit-to-stand are difficult.  She remains on ibuprofen and oxycodone as needed basis.  Continues to work full-time.  2024 - Patient presents for 3-month W/C FUV regarding their low back pain. Patient reports although previously reported minimal relief with DANITZA on 24, she now notes that she did in fact receive >50% reduction in pain for at least 8 weeks. 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 She found significant improvement in her functionality and quality of life. Her pain gradually returned to baseline and is now impacting her ADLs and functionality.  She presents today to discuss next steps in her treatment plan with the hope of decreasing her pain and increasing her functionality and quality of life.   2024 - Patient presents for FUV after a Left L5-S1 TFESI on 24.  She reports minimal relief after the procedure.  Still continues to complain of low back pain with radiation to the left lower extremity extending to the calf.  Patient remains on ibuprofen and oxycodone as needed.  The patient presents for initial W/C evaluation regarding their low back pain. Patient was referred by Dr. Diggs. 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 Patient had a L5-S1 laminectomy/discectomy about 5 years ago which had resolver her radicular leg pain at the time.  After the work injury her pain is across the lower back with radiation down the left lower extremity; this is markedly worse than prior.  Of note, patient is scheduled for left wrist surgery in the upcoming weeks.  Patient had missed several months following her injury have returned to a full duty capacity in 2024.  WC DOI: 2023 Occupation: Teacher Working status: Full duty  Injections: 1) Left L5-S1 TFESI (2024) 2) Left L5-S1, S1 TFESI (2025) 3) Bilateral L4-5, L5-S1 facet joint MBB (3/25/2025, 2025)  Pertinent Surgical History: 1) L5-S1 Discectomy (~5 years ago) - Dr. Trejo  Imagin) MRI Lumbar Spine (2023) - [facility]  At L1-2: No significant spinal canal or neural foraminal stenosis. At L2-3: No significant spinal canal or neural foraminal stenosis. At L3-4: No significant spinal canal or neural foraminal stenosis. At L4-5: No significant spinal canal or neural foraminal stenosis. At L5-S1: Bilateral hemilaminectomy changes are noted. There is grade 1 anterolisthesis of L5 on S1 with uncovering of the intervertebral disc. There is a superimposed disc bulge with bilateral facet arthrosis resulting in severe left with impingement of the exiting L5 nerve root and mild right-sided neural foraminal narrowing.    Physician Disclaimer: I have personally reviewed and confirmed all HPI data with the patient.  [] : Post Surgical Visit: no [de-identified] : physical activity  [de-identified] : L MRI AT

## 2025-05-27 NOTE — ASSESSMENT
[FreeTextEntry1] : A discussion regarding available pain management treatment options occurred with the patient.  These included interventional, rehabilitative, pharmacological, and alternative modalities. We will proceed with the following:    Interventional treatment options:  - Proceed with bilateral L4-L5, L5-S1 facet joint RFA with fluoroscopic guidance  - Can consider repeat left L5-S1, S1 TFESI (80 mg Kenalog) with return of severe left radicular pain - see additional instructions below    Rehabilitative options:   - Continue physical therapy for low back - participation in active HEP was discussed and encouraged as tolerated - Home exercise sheet provided at prior visit  Medication based treatment options:   - Continue ibuprofen 400-600 mg up to TID as needed - Continue Oxycodone 10 mg BID as needed for severe pain; agreed to send a 2-week supply today to bridge patient to her appt with prescribing physician - Patient on chronic opioid therapy; once again discussed risks of habituation, tolerance, and dependence - see additional instructions below    Complementary treatment options:   - Weight management and lifestyle modifications discussed - Patient failed chiropractic care - Continues pending approval for acupuncture from [employer]  Additional treatment recommendations as follows:   - patient will follow-up with Dr. Diggs as directed - Follow up 4-6 weeks post RFA for assessment of efficacy and further treatment recommendations  The risks, benefits and alternatives of the proposed procedure were explained in detail with the patient. The risks outlined include but are not limited to infection, bleeding, post- dural puncture headache, nerve injury, a temporary increase in pain, failure to resolve symptoms, need for future interventions, allergic reaction, and possible elevation of blood sugar in diabetics if using corticosteroid.  All questions were answered to patient's apparent satisfaction, and he/she verbalized an understanding.  Patient has lumbosacral axial pain that is consistent with facet joint pathology.  A diagnostic temporary block with local anesthetic of the medial branch was performed and has resulted in at least a 50% reduction in pain for the duration of the specific local anesthetic effect.  The pain is not radicular and there is absence of nerve root compression.  There is no prior spinal fusion surgery at the level targeted.  The pain has failed to respond to three months of conservative therapy.  We have discussed the risks, benefits, and alternatives for NSAID therapy including but not limited to the risk of bleeding, thrombosis, gastric mucosal irritation/ulceration, allergic reaction and kidney dysfunction.  The patient verbalizes an understanding.

## 2025-06-04 NOTE — REASON FOR VISIT
August 18, 2021    Important [facility] Information    RHONA REESE  [address]  [address]  [place] [address]  Your Care Plan  Dear Rhona,  When we spoke recently, I promised to send you a Care Plan. The plan enclosed is a summary of our discussion. It includes the steps we agreed would help you meet your health goals. In addition, I can help you with:  Bsyzegx-S-AuipGD  This program is available to members who need a ride to medical and dental visits. To schedule a ride, call [phone] or [phone] (toll free). TTY: 711. You can call 8 a.m. to 8 p.m. Seven days a week. Access to a representative may be limited at times.    Scooters   The Scooters program empowers you to improve your health through education and exercise. To learn more, visit [employer], or call [employer] at [phone] (toll free) (TTY:711) from 7 a.m. - 7 p.m. Central Time, Monday-Friday.  Health Care Directive   This form helps you outline your health care wishes. You can request a form from me and I will answer any questions you have before you discuss it with your doctor.   Annual Physical  Take a key step on your path to good health and set up an annual physical at your clinic.  Questions?  Call me at   Monday-Friday between 8am and 5pm.  TTY: 711. As we discussed, I plan to be in touch with you again in 6 months to follow up via phone.  Sincerely,    Ismael Hanson RN, PHN  [phone]  [email or web address]    cc: member records                                                                                             CB5 (Os) (5-2020)    Civil Rights Notice  Discrimination is against the law. [facility] does not discriminate on the basis of any of the following:    Race    Color    National Origin    Creed    Bahai    Age    Public Assistance Status    Receipt of Health Care Services    Disability (including physical or mental impairment)    Sex (including sex stereotypes and  gender identity)    Marital Status    Political Beliefs    Medical Condition    Genetic Information    Sexual Orientation    Claims Experience    Medical History    Health Status    Auxiliary Aids and Services:  [facility] provides auxiliary aids and services, like qualified interpreters or information in accessible formats, free of charge and in a timely manner, to ensure an equal opportunity to participate in our health care programs. Contact [facility] at [place]/contact medicaid or call [phone] (toll free); TTY:714 or at [email or web address].    Language Assistance Services:  [employer] provides translated documents and spoken language interpreting, free of charge and in a timely manner, when language assistance services are necessary to ensure limited English speakers have meaningful access to our information and services. Contact [employer] at [phone] (toll free); TTY: 088 or [place]/contactmedicaid.     Civil Rights Complaints  You have the right to file a discrimination complaint if you believe you were treated in a discriminatory way by [facility]. You may contact any of the following four agencies directly to file a discrimination complaint.    U.S. Department of Health and Human Services  Office for Civil Rights (OCR)  You have the right to file a complaint with the OCR, a federal agency, if you believe you have been discriminated against because of any of the following:    Race    Disability    Color    Sex    National Origin    Age    Uatsdin (in some cases)    Contact the OCR directly to file a complaint:         Director         U.S. Department of Health and Human Services  Office for Civil Rights         [address] [place] [address], [place] [address]         Customer Response Center: Toll-free: [phone]          TDD: [phone]         Email: [email or web address]    [place] Department of Human Rights (Formerly [facility])  In [place], you have the right to  file a complaint with the MDHR if you believe you have been discriminated against because of any of the following:      Race    Color    National Origin    Cheondoism    Creed    Sex    Sexual Orientation    Marital Status    Public Assistance Status    Disability    Contact the MDHR directly to file a complaint:         [employer]         [address] [address]         [phone] (voice)          [phone] (toll free)         711 or [phone] ([place] Relay)         [phone] (Fax)         [email or web address] (Email)     [place] Department of Human Services (DHS)  You have the right to file a complaint with [facility] if you believe you have been discriminated against in our health care programs because of any of the following:    Race    Color    National Origin    Creed    Cheondoism    Age    Public Assistance Status    Receipt of Health Care Services    Disability (including physical or mental impairment)    Sex (including sex stereotypes and gender identity)    Marital Status    Political Beliefs    Medical Condition    Genetic Information    Sexual Orientation    Claims Experience    Medical History    Health Status    Complaints must be in writing and filed within 180 days of the date you discovered the alleged discrimination. The complaint must contain your name and address and describe the discrimination you are complaining about. After we get your complaint, we will review it and notify you in writing about whether we have authority to investigate. If we do, we will investigate the complaint.      [facility] will notify you in writing of the investigation s outcome. You have a right to appeal the outcome if you disagree with the decision. To appeal, you must send a written request to have [facility] review the investigation outcome. Be brief and state why you disagree with the decision. Include additional information you think is important.       If you file a complaint in this way, the people who work for the agency named in the complaint cannot retaliate against you. This means they cannot punish you in any way for filing a complaint. Filing a complaint in this way does not stop you from seeking out other legal or administration actions.     Contact DHS directly to file a discrimination complaint:        Civil Rights Coordinator        [facility] of Human Services        Equal Opportunity and Access Division        [address] [address]        [place] [address] [phone] (voice) or use your preferred relay service     [facility] Complaint Notice   You have the right to file a complaint with [facility] if you believe you have been discriminated against because of any of the following:       Medical condition    Health status    Receipt of health care services    Claims experience    Medical history    Genetic information    Disability (including mental or physical impairment)    Marital status    Age    Sex (including sex stereotypes and gender identity)    Sexual orientation    National origin    Race    Color    Restorationist    Creed    Public assistance status    Political beliefs    You can file a complaint and ask for help in filing a complaint in person or by mail, phone, fax, or email at:     [facility] Civil Rights Coordinator  [facility]  [address], [place] [address] [phone] (voice and fax) or TTY:950  Email: [email or web address]    American Indians can begin or continue to use [facility] and [facility] ([facility]) clinics. We will not require prior approval or impose any conditions for you to get services at these clinics. For elders age 65 years and older this includes Elderly Waiver (EW) services accessed through the [place]. If a doctor or other provider in a [facility] or [facility] clinic refers you to a provider in our network, we will not require you to see your primary care provider  prior to the referral.   [FreeTextEntry2] : l-spine, wc 9/12/23

## 2025-06-04 NOTE — PHYSICAL EXAM
[Normal Coordination] : normal coordination [Normal DTR UE/LE] : normal DTR UE/LE  [Normal Sensation] : normal sensation [Normal Mood and Affect] : normal mood and affect [Oriented] : oriented [Able to Communicate] : able to communicate [Normal Skin] : normal skin [No Rash] : no rash [No Ulcers] : no ulcers [No Lesions] : no lesions [No obvious lymphadenopathy in areas examined] : no obvious lymphadenopathy in areas examined [Well Developed] : well developed [Peripheral vascular exam is grossly normal] : peripheral vascular exam is grossly normal [No Respiratory Distress] : no respiratory distress [de-identified] : Constitutional: - General Appearance: Unremarkable Body Habitus Well Developed Well Nourished Body Habitus No Deformities Well Groomed Ability To communicate: Normal Neurologic: Global sensation is intact to upper and lower extremities. See examination of Neck and/or Spine for exceptions. Orientation to Time, Place and Person is: Normal Mood And Affect is Normal Skin: - Head/Face, Right Upper/Lower Extremity, Left Upper/Lower Extremity: Normal See Examination of Neck and/or Spine for exceptions Cardiovascular: Peripheral Cardiovascular System is Normal Palpation of Lymph Nodes: Normal Palpation of lymph nodes in: Axilla, Cervical, Inguinal Abnormal Palpation of lymph nodes in: None  [Extension] : extension [4___] : left quadriceps 4[unfilled]/5 [3___] : left dorsiflexors 3[unfilled]/5 [5___] : right extensor hallicus longus 5[unfilled]/5 [] : patient ambulates without assistive device [FreeTextEntry9] : Relief with Flexion

## 2025-06-04 NOTE — DISCUSSION/SUMMARY
[Medication Risks Reviewed] : Medication risks reviewed [de-identified] : Patient experienced significant transient relief in low back pain following first L4/5 L5S1 MBB on March 25th. She will continue to follow up with Dr. Alvarez for second MBB and potential RFA depending on her response to second MBB. Continue to await authorization for PT. Patient was provided with a referral for lumbar physical therapy to work on stretching, strengthening and range of motion. Acupuncture therapy 2x a week for 6 wks, again as I believe this will provide relief. Discussed continued medical mgmt with ibuprofen and oxycodone. Renewed medication today. Medication management and risks reviewed.  She will follow up in 6 weeks  Prior to appointment and during encounter with patient extensive medical records were reviewed including but not limited to, hospital records, outpatient records, imaging results, and lab data.During this appointment the patient was examined, diagnoses were discussed and explained in a face to face manner. In addition extensive time was spent reviewing aforementioned diagnostic studies. Counseling including abnormal image results, differential diagnoses, treatment options, risk and benefits, lifestyle changes, current condition, and current medications was performed. Patient's comments, questions, and concerns were addressed and patient verbalized understanding. Based on this patient's presentation at our office, which is an orthopedic spine surgeon's office, this patient inherently / intrinsically has a risk, however minute, of developing issues such as Cauda equina syndrome, bowel and bladder changes, or progression of motor or neurological deficits such as paralysis which may be permanent.  MEGHANA SILVESTRE Acting as a Scribe for Meghana Malik, attest that this documentation has been prepared under the direction and in the presence of Provider Moisés Diggs MD.

## 2025-06-04 NOTE — HISTORY OF PRESENT ILLNESS
[de-identified] : 04/28/2025 - Patient returns for workers compensation follow up. Physical therapy continues to be denied. She has continued with home exercises in hopes to manage symptoms. Her last PT trial was last February. She had MBB L4/5 L5S1 on March 25th with Dr. Alvarez and noted significant transient relief in normal low back pain. Continues to utilize oxycodone and anti-inflammatories.  03/21/2025: Patient presenting for W/C follow up. Patient is doing better. She is following up with Dr. Alvarez on 3/25/25 for Medial branch block. No calf or thigh pain since epidural, occasional buttock pain. Patient states her acupuncture was denied.   02/24/2025 - Patient returns to the office for a routine follow. She has recently followed up with interventional pain management and reports she has received authorization for lumbar medial branch blocks and facet ablation. She is eager to schedule as soon as availability permits. To continue to utilize oxycodone and anti-inflammatories.  01/27/2025 - Patient presenting for workers compensation follow up. She complains of persistent leg tingling in her left shin, as well as left buttock pain radiating throughout entire leg into ankle. She is scheduled for LESI tomorrow. Taking oxycodone as prescribed for pain management. Continues lumbar HEP, completed PT. Stopped chiropractic care due to increase in pain.   12/23/2024:  Patient presenting today for a follow up visit. She continues to experience lower back pain with symptoms radiating into the distal lower extremity. She is using the anti-inflammatory tablet regularly along with the oxycodone. She is scheduled an additional injection with Dr. Nguyen for end of January 2025. She has been continuing with chiropractic treatment but does not feel this is giving her any significant benefit lately.   11/22/2024 - Patient presenting in follow up workers comp. She complains of intermittent left leg pain. Pain is worse with extended sitting. Additional LESI at the L5S1 level was approved, and she is in the process of setting up another injection. Started going to chiropractic therapy recently and still evaluating her response. Her medication regimen remains the same.   10/25/2024 - The patient is presenting for a workers' compensation follow-up. She complains of back and leg pain, with the back pain being more severe. The pain worsens throughout the workday and with bending. She received her first injection, but the second injection was denied. She plans to start chiropractic therapy shortly. She wishes to proceed with the indicated injection as recommended by Dr. Alvarez. Taking ibuprofen and oxycodone for symptom control which are both helpful. Voltaregn gel is also helpful for pain control during the day.   09/18/2024 - Patient presenting in follow up. Symptoms remain the same. Since the LESI she reports no real relief. She is awaiting to hear if a different injection procedure has been approved which was indicated by Dr. Alvarez. She continues home stretching exercises. Uses heat and ice for pain control. Taking oxycodone and ibuprofen as prescribed for symptom control. She is working. She is in the process of finding chiropractor who takes workers comp.   08/19/2024 - Patient returns to the office for follow up. She has received lumbar steroid injection. It has been 10 days since the injection. She does not feel any benefit. She has an appointment to follow up with Dr. Alvarez tomorrow to discuss potential additional injections or alternative procedures. She continues to use oxycodone and anti-inflammatory medication's. She has completed left surgery. She has not yet been able to establish chiropractic care with a provider who accepts her insurance  07/22/2024 - Pt presents in follow up. She complains of increase in left sided radicular leg pain. She had consult with Dr. Alvarez and she plans to move forward with LESI. Also feels chiropractic therapy may be beneficial in improving symptoms. Leg pain most prevalent while sleeping. Denies right sided leg pain. scheduled for left wrist surgery this Friday - not taking nsaids as a result. Takes oxycodone or Tylenol for control of symptoms.   06/21/2024 - Patient presenting in follow up. Patient reports no real changes. She complains of increased low back pain radiating into left leg. She is scheduled for left wrist surgery end of July. Takes oxycodone and ibuprofen for symptom control.   05/20/2024 - Patient presenting for follow up of back and leg pain. She primarily complains of pain in the left lower lumbar/buttock/hip region. She is managing her pain with oxycodone and ibuprofen as prescribed which is helpful. She is considering a LESI. No change to general medical health.   04/22/2024: Patient is here today for a follow up for back and leg pain. She states that she is currently experiencing a slight aggravation of her pain due to recent activity. She states that she experiences radiating pain when standing and leaning on the left side. She continues to perform at home exercises. Symptoms are consistent in intensity and location.   03/18/2024 - The patient reported experiencing both back and leg pain. She finds relief by taking ibuprofen daily and using oxycodone as prescribed at night. Although prior physical therapy was beneficial, it was discontinued due to a busy schedule; however, she continues to do the exercises at home. At this time, she is not interested in receiving interventional injections.  02/05/2024 - The patient is here for a follow-up, reporting constant low back pain along with pain radiating into the left leg. Despite these symptoms, she has returned to full-duty work and is effectively managing the pain. She takes ibuprofen and a muscle relaxer at night for relief. Additionally, she is actively participating in physical therapy, which has proven to be beneficial in managing her symptoms.  12/27/2023 - Patient presenting in follow up complaining of low back and left leg pain. She has been enrolled in physical therapy twice per week which is overall helpful. She has been out of work and feels ready to return. She is taking ibuprofen during the day and oxycodone at nighttime which is helpful for symptom control.   11/15/2023 - Patient presenting for MRI Review of L Spine. She primarily complains of left lower extremity pain/achiness worse with standing and weightbearing.   10/25/2023 - 45 y/o F presenting for initial evaluation of of low back pain, LLE numbness and weakness after a work-related injury on 9/12/23. 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 Landed onto her left side, wrist, and back. Symptom wise, since the accident she reports axial low back pain L > R. Also, complaints of intermittent numbness/tingling into the left leg. Denies any changes in LE strength. Wrist is being treated, she is wearing splint and received injections. She reports history of laminectomy a few years prior which was overall helpful. Weakness was never apparent in the past, and has been present since the injury. Treated with MDP and Tylenol without much relief. Pain becomes worse with extended periods of siting, standing, and walking. Received XRAYS which revealed no acute fractures. She is not working.    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

## 2025-06-04 NOTE — DISCUSSION/SUMMARY
[Medication Risks Reviewed] : Medication risks reviewed [de-identified] : Patient experienced significant transient relief in low back pain following first L4/5 L5S1 MBB on March 25th. She will continue to follow up with Dr. Alvarez for second MBB and potential RFA depending on her response to second MBB. Continue to await authorization for PT. Patient was provided with a referral for lumbar physical therapy to work on stretching, strengthening and range of motion. Acupuncture therapy 2x a week for 6 wks, again as I believe this will provide relief. Discussed continued medical mgmt with ibuprofen and oxycodone. Renewed medication today. Medication management and risks reviewed.  She will follow up in 6 weeks  Prior to appointment and during encounter with patient extensive medical records were reviewed including but not limited to, hospital records, outpatient records, imaging results, and lab data.During this appointment the patient was examined, diagnoses were discussed and explained in a face to face manner. In addition extensive time was spent reviewing aforementioned diagnostic studies. Counseling including abnormal image results, differential diagnoses, treatment options, risk and benefits, lifestyle changes, current condition, and current medications was performed. Patient's comments, questions, and concerns were addressed and patient verbalized understanding. Based on this patient's presentation at our office, which is an orthopedic spine surgeon's office, this patient inherently / intrinsically has a risk, however minute, of developing issues such as Cauda equina syndrome, bowel and bladder changes, or progression of motor or neurological deficits such as paralysis which may be permanent.  MEGHANA SILVESTRE Acting as a Scribe for Meghana Malik, attest that this documentation has been prepared under the direction and in the presence of Provider Moisés Diggs MD.

## 2025-06-04 NOTE — HISTORY OF PRESENT ILLNESS
[de-identified] : 04/28/2025 - Patient returns for workers compensation follow up. Physical therapy continues to be denied. She has continued with home exercises in hopes to manage symptoms. Her last PT trial was last February. She had MBB L4/5 L5S1 on March 25th with Dr. Alvarez and noted significant transient relief in normal low back pain. Continues to utilize oxycodone and anti-inflammatories.  03/21/2025: Patient presenting for W/C follow up. Patient is doing better. She is following up with Dr. Alvarez on 3/25/25 for Medial branch block. No calf or thigh pain since epidural, occasional buttock pain. Patient states her acupuncture was denied.   02/24/2025 - Patient returns to the office for a routine follow. She has recently followed up with interventional pain management and reports she has received authorization for lumbar medial branch blocks and facet ablation. She is eager to schedule as soon as availability permits. To continue to utilize oxycodone and anti-inflammatories.  01/27/2025 - Patient presenting for workers compensation follow up. She complains of persistent leg tingling in her left shin, as well as left buttock pain radiating throughout entire leg into ankle. She is scheduled for LESI tomorrow. Taking oxycodone as prescribed for pain management. Continues lumbar HEP, completed PT. Stopped chiropractic care due to increase in pain.   12/23/2024:  Patient presenting today for a follow up visit. She continues to experience lower back pain with symptoms radiating into the distal lower extremity. She is using the anti-inflammatory tablet regularly along with the oxycodone. She is scheduled an additional injection with Dr. Nguyen for end of January 2025. She has been continuing with chiropractic treatment but does not feel this is giving her any significant benefit lately.   11/22/2024 - Patient presenting in follow up workers comp. She complains of intermittent left leg pain. Pain is worse with extended sitting. Additional LESI at the L5S1 level was approved, and she is in the process of setting up another injection. Started going to chiropractic therapy recently and still evaluating her response. Her medication regimen remains the same.   10/25/2024 - The patient is presenting for a workers' compensation follow-up. She complains of back and leg pain, with the back pain being more severe. The pain worsens throughout the workday and with bending. She received her first injection, but the second injection was denied. She plans to start chiropractic therapy shortly. She wishes to proceed with the indicated injection as recommended by Dr. Alvarez. Taking ibuprofen and oxycodone for symptom control which are both helpful. Voltaregn gel is also helpful for pain control during the day.   09/18/2024 - Patient presenting in follow up. Symptoms remain the same. Since the LESI she reports no real relief. She is awaiting to hear if a different injection procedure has been approved which was indicated by Dr. Alvarez. She continues home stretching exercises. Uses heat and ice for pain control. Taking oxycodone and ibuprofen as prescribed for symptom control. She is working. She is in the process of finding chiropractor who takes workers comp.   08/19/2024 - Patient returns to the office for follow up. She has received lumbar steroid injection. It has been 10 days since the injection. She does not feel any benefit. She has an appointment to follow up with Dr. Alvarez tomorrow to discuss potential additional injections or alternative procedures. She continues to use oxycodone and anti-inflammatory medication's. She has completed left surgery. She has not yet been able to establish chiropractic care with a provider who accepts her insurance  07/22/2024 - Pt presents in follow up. She complains of increase in left sided radicular leg pain. She had consult with Dr. Alvarez and she plans to move forward with LESI. Also feels chiropractic therapy may be beneficial in improving symptoms. Leg pain most prevalent while sleeping. Denies right sided leg pain. scheduled for left wrist surgery this Friday - not taking nsaids as a result. Takes oxycodone or Tylenol for control of symptoms.   06/21/2024 - Patient presenting in follow up. Patient reports no real changes. She complains of increased low back pain radiating into left leg. She is scheduled for left wrist surgery end of July. Takes oxycodone and ibuprofen for symptom control.   05/20/2024 - Patient presenting for follow up of back and leg pain. She primarily complains of pain in the left lower lumbar/buttock/hip region. She is managing her pain with oxycodone and ibuprofen as prescribed which is helpful. She is considering a LESI. No change to general medical health.   04/22/2024: Patient is here today for a follow up for back and leg pain. She states that she is currently experiencing a slight aggravation of her pain due to recent activity. She states that she experiences radiating pain when standing and leaning on the left side. She continues to perform at home exercises. Symptoms are consistent in intensity and location.   03/18/2024 - The patient reported experiencing both back and leg pain. She finds relief by taking ibuprofen daily and using oxycodone as prescribed at night. Although prior physical therapy was beneficial, it was discontinued due to a busy schedule; however, she continues to do the exercises at home. At this time, she is not interested in receiving interventional injections.  02/05/2024 - The patient is here for a follow-up, reporting constant low back pain along with pain radiating into the left leg. Despite these symptoms, she has returned to full-duty work and is effectively managing the pain. She takes ibuprofen and a muscle relaxer at night for relief. Additionally, she is actively participating in physical therapy, which has proven to be beneficial in managing her symptoms.  12/27/2023 - Patient presenting in follow up complaining of low back and left leg pain. She has been enrolled in physical therapy twice per week which is overall helpful. She has been out of work and feels ready to return. She is taking ibuprofen during the day and oxycodone at nighttime which is helpful for symptom control.   11/15/2023 - Patient presenting for MRI Review of L Spine. She primarily complains of left lower extremity pain/achiness worse with standing and weightbearing.   10/25/2023 - 45 y/o F presenting for initial evaluation of of low back pain, LLE numbness and weakness after a work-related injury on 9/12/23. 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 Landed onto her left side, wrist, and back. Symptom wise, since the accident she reports axial low back pain L > R. Also, complaints of intermittent numbness/tingling into the left leg. Denies any changes in LE strength. Wrist is being treated, she is wearing splint and received injections. She reports history of laminectomy a few years prior which was overall helpful. Weakness was never apparent in the past, and has been present since the injury. Treated with MDP and Tylenol without much relief. Pain becomes worse with extended periods of siting, standing, and walking. Received XRAYS which revealed no acute fractures. She is not working.    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

## 2025-06-04 NOTE — PHYSICAL EXAM
[Normal Coordination] : normal coordination [Normal DTR UE/LE] : normal DTR UE/LE  [Normal Sensation] : normal sensation [Normal Mood and Affect] : normal mood and affect [Oriented] : oriented [Able to Communicate] : able to communicate [Normal Skin] : normal skin [No Rash] : no rash [No Ulcers] : no ulcers [No Lesions] : no lesions [No obvious lymphadenopathy in areas examined] : no obvious lymphadenopathy in areas examined [Well Developed] : well developed [Peripheral vascular exam is grossly normal] : peripheral vascular exam is grossly normal [No Respiratory Distress] : no respiratory distress [de-identified] : Constitutional: - General Appearance: Unremarkable Body Habitus Well Developed Well Nourished Body Habitus No Deformities Well Groomed Ability To communicate: Normal Neurologic: Global sensation is intact to upper and lower extremities. See examination of Neck and/or Spine for exceptions. Orientation to Time, Place and Person is: Normal Mood And Affect is Normal Skin: - Head/Face, Right Upper/Lower Extremity, Left Upper/Lower Extremity: Normal See Examination of Neck and/or Spine for exceptions Cardiovascular: Peripheral Cardiovascular System is Normal Palpation of Lymph Nodes: Normal Palpation of lymph nodes in: Axilla, Cervical, Inguinal Abnormal Palpation of lymph nodes in: None  [Extension] : extension [4___] : left quadriceps 4[unfilled]/5 [3___] : left dorsiflexors 3[unfilled]/5 [5___] : right extensor hallicus longus 5[unfilled]/5 [] : patient ambulates without assistive device [[email or web address]] : Relief with Flexion